# Patient Record
Sex: FEMALE | Race: WHITE | NOT HISPANIC OR LATINO
[De-identification: names, ages, dates, MRNs, and addresses within clinical notes are randomized per-mention and may not be internally consistent; named-entity substitution may affect disease eponyms.]

---

## 2020-02-08 PROBLEM — Z00.00 ENCOUNTER FOR PREVENTIVE HEALTH EXAMINATION: Status: ACTIVE | Noted: 2020-02-08

## 2023-05-30 ENCOUNTER — APPOINTMENT (OUTPATIENT)
Dept: CARDIOLOGY | Facility: CLINIC | Age: 43
End: 2023-05-30

## 2023-07-28 ENCOUNTER — APPOINTMENT (OUTPATIENT)
Dept: HUMAN REPRODUCTION | Facility: CLINIC | Age: 43
End: 2023-07-28
Payer: COMMERCIAL

## 2023-07-28 PROCEDURE — 76830 TRANSVAGINAL US NON-OB: CPT

## 2023-07-28 PROCEDURE — 36415 COLL VENOUS BLD VENIPUNCTURE: CPT

## 2023-07-28 PROCEDURE — 99205 OFFICE O/P NEW HI 60 MIN: CPT | Mod: 25

## 2023-08-16 ENCOUNTER — APPOINTMENT (OUTPATIENT)
Dept: PULMONOLOGY | Facility: CLINIC | Age: 43
End: 2023-08-16
Payer: COMMERCIAL

## 2023-08-16 VITALS
BODY MASS INDEX: 22.09 KG/M2 | WEIGHT: 131 LBS | OXYGEN SATURATION: 97 % | DIASTOLIC BLOOD PRESSURE: 79 MMHG | HEIGHT: 64.5 IN | HEART RATE: 100 BPM | SYSTOLIC BLOOD PRESSURE: 120 MMHG | RESPIRATION RATE: 15 BRPM | TEMPERATURE: 97.8 F

## 2023-08-16 DIAGNOSIS — Z78.9 OTHER SPECIFIED HEALTH STATUS: ICD-10-CM

## 2023-08-16 PROCEDURE — 99203 OFFICE O/P NEW LOW 30 MIN: CPT

## 2023-08-16 NOTE — PHYSICAL EXAM
[No Acute Distress] : no acute distress [Well Nourished] : well nourished [No Deformities] : no deformities [Enlarged Base of the Tongue] : enlarged base of the tongue [IV] : Mallampati Class: IV [Normal Appearance] : normal appearance [No Neck Mass] : no neck mass [Normal Rate/Rhythm] : normal rate/rhythm [Normal S1, S2] : normal s1, s2 [No Resp Distress] : no resp distress [Clear to Auscultation Bilaterally] : clear to auscultation bilaterally [No Clubbing] : no clubbing [No Cyanosis] : no cyanosis [No Edema] : no edema [No Focal Deficits] : no focal deficits [Oriented x3] : oriented x3 [Normal Mood] : normal mood [Normal Affect] : normal affect

## 2023-08-16 NOTE — HISTORY OF PRESENT ILLNESS
[Never] : never [Awakes Unrefreshed] : awakes unrefreshed [Awakes with Dry Mouth] : awakes with dry mouth [Difficulty Maintaining Sleep] : difficulty maintaining sleep [Unusual Sleep Behavior] : unusual sleep behavior [TextBox_4] : This is a 43 year old female who presents to the office with concerns about her sleep. She mentions having heart palpitations in March but was reassured by a visit to [place] that everything seemed fine. She reports experiencing symptoms such as night terrors, dry mouth upon waking up, and difficulty breathing through her nose due to previous nasal surgeries. The patient describes herself as a light sleeper who wakes up early and experiences sleep disturbances throughout the night.  She has tried melatonin spray and Ambien for sleep but prefers not to rely on medication. The patient has no known allergies and reports using marijuana edibles as a sleep aid. She wishes to undergo a sleep test to rule out any sleep-related issues.  [Awakes with Headache] : does not awaken with headache [Snoring] : no snoring [Witnessed Apneas] : no witnessed apneas [Hypersomnolence] : no hypersomnolence [Cataplexy] : no cataplexy [Sleep Paralysis] : no sleep paralysis [Hypnagogic Hallucinations] : no hypnagogic hallucinations [Hypnopompic Hallucinations] : no hypnopompic hallucinations [Lower Extremity Discomfort] : does not have lower extremity discomfort [Irresistible urge to move legs] : does not have irresistible urge to move legs [LE discomfort relieved by movement] : denies lower extremity discomfort relieved by movement [Late day/ Evening symptoms] : does not have late day/ evening symptoms [Sleep Disturbances due to LE symptoms] : denies sleep disturbances due to lower extremity symptoms [TextBox_77] : 11pm [TextBox_79] : 5:00am [TextBox_81] : 05-42 [TextBox_83] : 4-5 [ESS] : 10

## 2023-08-16 NOTE — ASSESSMENT
[FreeTextEntry1] : This is a 43-year-old female with no significant past medical history who comes in for evaluation of possible sleep disordered breathing.  #Snoring–patient has signs and symptoms possible sleep disordered breathing including snoring, nonrestorative sleep, early morning dry mouth, excessive daytime somnolence, and frequent nocturnal awakenings. She was referred to the [facility] for a diagnostic HSAT. The ramifications of LOKESH and its potential therapeutic modalities were discussed with the patient. The patient was cautioned on the importance of avoiding drowsy driving. She will follow up with us after the HSAT.

## 2023-08-29 ENCOUNTER — APPOINTMENT (OUTPATIENT)
Dept: HUMAN REPRODUCTION | Facility: CLINIC | Age: 43
End: 2023-08-29
Payer: COMMERCIAL

## 2023-08-29 PROCEDURE — 99215 OFFICE O/P EST HI 40 MIN: CPT

## 2023-09-14 ENCOUNTER — OUTPATIENT (OUTPATIENT)
Dept: OUTPATIENT SERVICES | Facility: HOSPITAL | Age: 43
LOS: 1 days | End: 2023-09-14
Payer: COMMERCIAL

## 2023-09-14 ENCOUNTER — APPOINTMENT (OUTPATIENT)
Dept: SLEEP CENTER | Facility: CLINIC | Age: 43
End: 2023-09-14
Payer: COMMERCIAL

## 2023-09-14 PROCEDURE — 95800 SLP STDY UNATTENDED: CPT

## 2023-09-14 PROCEDURE — 95800 SLP STDY UNATTENDED: CPT | Mod: 26

## 2023-09-28 DIAGNOSIS — G47.33 OBSTRUCTIVE SLEEP APNEA (ADULT) (PEDIATRIC): ICD-10-CM

## 2023-09-29 ENCOUNTER — APPOINTMENT (OUTPATIENT)
Dept: PULMONOLOGY | Facility: CLINIC | Age: 43
End: 2023-09-29
Payer: COMMERCIAL

## 2023-09-29 DIAGNOSIS — R06.83 SNORING: ICD-10-CM

## 2023-09-29 PROCEDURE — 99213 OFFICE O/P EST LOW 20 MIN: CPT | Mod: 95

## 2023-10-02 ENCOUNTER — NON-APPOINTMENT (OUTPATIENT)
Age: 43
End: 2023-10-02

## 2023-10-06 ENCOUNTER — APPOINTMENT (OUTPATIENT)
Dept: HUMAN REPRODUCTION | Facility: CLINIC | Age: 43
End: 2023-10-06

## 2023-10-06 ENCOUNTER — APPOINTMENT (OUTPATIENT)
Dept: HUMAN REPRODUCTION | Facility: CLINIC | Age: 43
End: 2023-10-06
Payer: COMMERCIAL

## 2023-10-06 PROCEDURE — 99213 OFFICE O/P EST LOW 20 MIN: CPT | Mod: 25

## 2023-10-06 PROCEDURE — 76830 TRANSVAGINAL US NON-OB: CPT

## 2023-10-06 PROCEDURE — 36415 COLL VENOUS BLD VENIPUNCTURE: CPT

## 2023-10-11 ENCOUNTER — APPOINTMENT (OUTPATIENT)
Dept: HUMAN REPRODUCTION | Facility: CLINIC | Age: 43
End: 2023-10-11
Payer: COMMERCIAL

## 2023-10-11 PROCEDURE — 76857 US EXAM PELVIC LIMITED: CPT

## 2023-10-11 PROCEDURE — 36415 COLL VENOUS BLD VENIPUNCTURE: CPT

## 2023-10-11 PROCEDURE — 99213 OFFICE O/P EST LOW 20 MIN: CPT | Mod: 25

## 2023-10-15 ENCOUNTER — APPOINTMENT (OUTPATIENT)
Dept: HUMAN REPRODUCTION | Facility: CLINIC | Age: 43
End: 2023-10-15
Payer: COMMERCIAL

## 2023-10-15 PROCEDURE — 76857 US EXAM PELVIC LIMITED: CPT

## 2023-10-15 PROCEDURE — 36415 COLL VENOUS BLD VENIPUNCTURE: CPT

## 2023-10-15 PROCEDURE — 99213 OFFICE O/P EST LOW 20 MIN: CPT | Mod: 25

## 2023-10-18 ENCOUNTER — APPOINTMENT (OUTPATIENT)
Dept: HUMAN REPRODUCTION | Facility: CLINIC | Age: 43
End: 2023-10-18
Payer: COMMERCIAL

## 2023-10-18 PROCEDURE — 36415 COLL VENOUS BLD VENIPUNCTURE: CPT

## 2023-10-18 PROCEDURE — 76857 US EXAM PELVIC LIMITED: CPT

## 2023-10-18 PROCEDURE — 99213 OFFICE O/P EST LOW 20 MIN: CPT | Mod: 25

## 2023-10-20 ENCOUNTER — APPOINTMENT (OUTPATIENT)
Dept: HUMAN REPRODUCTION | Facility: CLINIC | Age: 43
End: 2023-10-20
Payer: COMMERCIAL

## 2023-10-20 PROCEDURE — 76857 US EXAM PELVIC LIMITED: CPT

## 2023-10-20 PROCEDURE — 99213 OFFICE O/P EST LOW 20 MIN: CPT | Mod: 25

## 2023-10-20 PROCEDURE — 36415 COLL VENOUS BLD VENIPUNCTURE: CPT

## 2023-10-22 ENCOUNTER — APPOINTMENT (OUTPATIENT)
Dept: HUMAN REPRODUCTION | Facility: CLINIC | Age: 43
End: 2023-10-22
Payer: COMMERCIAL

## 2023-10-22 PROCEDURE — 76857 US EXAM PELVIC LIMITED: CPT

## 2023-10-22 PROCEDURE — 99213 OFFICE O/P EST LOW 20 MIN: CPT | Mod: 25

## 2023-10-22 PROCEDURE — 36415 COLL VENOUS BLD VENIPUNCTURE: CPT

## 2023-10-23 ENCOUNTER — APPOINTMENT (OUTPATIENT)
Dept: HUMAN REPRODUCTION | Facility: CLINIC | Age: 43
End: 2023-10-23
Payer: COMMERCIAL

## 2023-10-23 PROCEDURE — 99213 OFFICE O/P EST LOW 20 MIN: CPT | Mod: 25

## 2023-10-23 PROCEDURE — 36415 COLL VENOUS BLD VENIPUNCTURE: CPT

## 2023-10-23 PROCEDURE — 76857 US EXAM PELVIC LIMITED: CPT

## 2023-10-24 ENCOUNTER — APPOINTMENT (OUTPATIENT)
Dept: HUMAN REPRODUCTION | Facility: CLINIC | Age: 43
End: 2023-10-24

## 2023-10-24 ENCOUNTER — APPOINTMENT (OUTPATIENT)
Dept: HUMAN REPRODUCTION | Facility: CLINIC | Age: 43
End: 2023-10-24
Payer: COMMERCIAL

## 2023-10-24 PROCEDURE — 36415 COLL VENOUS BLD VENIPUNCTURE: CPT

## 2023-10-25 ENCOUNTER — APPOINTMENT (OUTPATIENT)
Dept: HUMAN REPRODUCTION | Facility: CLINIC | Age: 43
End: 2023-10-25
Payer: COMMERCIAL

## 2023-10-25 PROCEDURE — 76948 ECHO GUIDE OVA ASPIRATION: CPT

## 2023-10-25 PROCEDURE — 89254 OOCYTE IDENTIFICATION: CPT

## 2023-10-25 PROCEDURE — 58970 RETRIEVAL OF OOCYTE: CPT

## 2023-10-25 PROCEDURE — 89346 STORAGE/YEAR OOCYTE(S): CPT

## 2023-10-25 PROCEDURE — 89337 CRYOPRESERVATION OOCYTE(S): CPT

## 2023-10-26 ENCOUNTER — APPOINTMENT (OUTPATIENT)
Dept: HUMAN REPRODUCTION | Facility: CLINIC | Age: 43
End: 2023-10-26
Payer: COMMERCIAL

## 2023-11-21 ENCOUNTER — APPOINTMENT (OUTPATIENT)
Age: 43
End: 2023-11-21

## 2024-01-31 ENCOUNTER — APPOINTMENT (OUTPATIENT)
Age: 44
End: 2024-01-31
Payer: COMMERCIAL

## 2024-01-31 PROCEDURE — D1110 PROPHYLAXIS - ADULT: CPT

## 2024-01-31 PROCEDURE — D0120: CPT

## 2024-02-15 ENCOUNTER — APPOINTMENT (OUTPATIENT)
Age: 44
End: 2024-02-15
Payer: COMMERCIAL

## 2024-02-15 PROCEDURE — D2391: CPT

## 2024-03-07 ENCOUNTER — APPOINTMENT (OUTPATIENT)
Age: 44
End: 2024-03-07
Payer: COMMERCIAL

## 2024-03-07 PROCEDURE — D9944: CPT

## 2024-04-01 ENCOUNTER — NON-APPOINTMENT (OUTPATIENT)
Age: 44
End: 2024-04-01

## 2024-04-11 ENCOUNTER — APPOINTMENT (OUTPATIENT)
Dept: DERMATOLOGY | Facility: CLINIC | Age: 44
End: 2024-04-11
Payer: COMMERCIAL

## 2024-04-11 DIAGNOSIS — L30.9 DERMATITIS, UNSPECIFIED: ICD-10-CM

## 2024-04-11 PROCEDURE — 99203 OFFICE O/P NEW LOW 30 MIN: CPT

## 2024-04-11 NOTE — ASSESSMENT
[FreeTextEntry1] :  seborrheic dermatitis, flaring discussed nature, chronicity and unpredictable course pimecrolimus to AA BID PRN flares vanicream z bar as face wash during bathing 3x weekly for maintenance

## 2024-04-11 NOTE — PHYSICAL EXAM
[Alert] : alert [Oriented x 3] : ~L oriented x 3 [Well Nourished] : well nourished [Conjunctiva Non-injected] : conjunctiva non-injected [No Visual Lymphadenopathy] : no visual  lymphadenopathy [No Clubbing] : no clubbing [No Edema] : no edema [No Bromhidrosis] : no bromhidrosis [No Chromhidrosis] : no chromhidrosis [FreeTextEntry3] : erythematous greasy patches on b/l NLF and chin

## 2024-04-11 NOTE — HISTORY OF PRESENT ILLNESS
[FreeTextEntry1] : np rash [de-identified] : Ms. CARY EVANS  is a 44 year old F here for evaluation of below  #asx rash around mouth , intermittent x yrs - tried many emollients - econazole and HC no personal or family h/o skin cancer Social Hx: director of clinical research for dental

## 2024-04-12 ENCOUNTER — TRANSCRIPTION ENCOUNTER (OUTPATIENT)
Age: 44
End: 2024-04-12

## 2024-04-15 RX ORDER — PIMECROLIMUS 10 MG/G
1 CREAM TOPICAL TWICE DAILY
Qty: 1 | Refills: 6 | Status: ACTIVE | COMMUNITY
Start: 2024-04-11 | End: 1900-01-01

## 2024-04-18 ENCOUNTER — EMERGENCY (EMERGENCY)
Facility: HOSPITAL | Age: 44
LOS: 0 days | Discharge: HOME | End: 2024-04-18
Attending: STUDENT IN AN ORGANIZED HEALTH CARE EDUCATION/TRAINING PROGRAM
Payer: COMMERCIAL

## 2024-04-18 VITALS
DIASTOLIC BLOOD PRESSURE: 72 MMHG | OXYGEN SATURATION: 97 % | RESPIRATION RATE: 17 BRPM | TEMPERATURE: 99 F | SYSTOLIC BLOOD PRESSURE: 110 MMHG | HEART RATE: 90 BPM

## 2024-04-18 VITALS
WEIGHT: 132.94 LBS | HEIGHT: 64.5 IN | TEMPERATURE: 98 F | RESPIRATION RATE: 14 BRPM | SYSTOLIC BLOOD PRESSURE: 136 MMHG | HEART RATE: 82 BPM | OXYGEN SATURATION: 98 % | DIASTOLIC BLOOD PRESSURE: 89 MMHG

## 2024-04-18 DIAGNOSIS — R82.71 BACTERIURIA: ICD-10-CM

## 2024-04-18 DIAGNOSIS — K59.00 CONSTIPATION, UNSPECIFIED: ICD-10-CM

## 2024-04-18 DIAGNOSIS — R10.9 UNSPECIFIED ABDOMINAL PAIN: ICD-10-CM

## 2024-04-18 DIAGNOSIS — R11.0 NAUSEA: ICD-10-CM

## 2024-04-18 DIAGNOSIS — Z88.0 ALLERGY STATUS TO PENICILLIN: ICD-10-CM

## 2024-04-18 DIAGNOSIS — D72.829 ELEVATED WHITE BLOOD CELL COUNT, UNSPECIFIED: ICD-10-CM

## 2024-04-18 DIAGNOSIS — N13.2 HYDRONEPHROSIS WITH RENAL AND URETERAL CALCULOUS OBSTRUCTION: ICD-10-CM

## 2024-04-18 DIAGNOSIS — D25.9 LEIOMYOMA OF UTERUS, UNSPECIFIED: ICD-10-CM

## 2024-04-18 LAB
ALBUMIN SERPL ELPH-MCNC: 4 G/DL — SIGNIFICANT CHANGE UP (ref 3.3–5)
ALP SERPL-CCNC: 53 U/L — SIGNIFICANT CHANGE UP (ref 40–120)
ALT FLD-CCNC: 13 U/L — SIGNIFICANT CHANGE UP (ref 12–78)
ANION GAP SERPL CALC-SCNC: 10 MMOL/L — SIGNIFICANT CHANGE UP (ref 5–17)
APPEARANCE UR: CLEAR — SIGNIFICANT CHANGE UP
AST SERPL-CCNC: 12 U/L — LOW (ref 15–37)
BACTERIA # UR AUTO: ABNORMAL /HPF
BASOPHILS # BLD AUTO: 0.06 K/UL — SIGNIFICANT CHANGE UP (ref 0–0.2)
BASOPHILS NFR BLD AUTO: 0.3 % — SIGNIFICANT CHANGE UP (ref 0–2)
BILIRUB SERPL-MCNC: 1.9 MG/DL — HIGH (ref 0.2–1.2)
BILIRUB UR-MCNC: NEGATIVE — SIGNIFICANT CHANGE UP
BUN SERPL-MCNC: 24 MG/DL — HIGH (ref 7–23)
CALCIUM SERPL-MCNC: 9.4 MG/DL — SIGNIFICANT CHANGE UP (ref 8.5–10.1)
CHLORIDE SERPL-SCNC: 106 MMOL/L — SIGNIFICANT CHANGE UP (ref 96–108)
CO2 SERPL-SCNC: 23 MMOL/L — SIGNIFICANT CHANGE UP (ref 22–31)
COLOR SPEC: YELLOW — SIGNIFICANT CHANGE UP
CREAT SERPL-MCNC: 1.05 MG/DL — SIGNIFICANT CHANGE UP (ref 0.5–1.3)
DIFF PNL FLD: ABNORMAL
EGFR: 68 ML/MIN/1.73M2 — SIGNIFICANT CHANGE UP
EOSINOPHIL # BLD AUTO: 0.01 K/UL — SIGNIFICANT CHANGE UP (ref 0–0.5)
EOSINOPHIL NFR BLD AUTO: 0.1 % — SIGNIFICANT CHANGE UP (ref 0–6)
GLUCOSE SERPL-MCNC: 133 MG/DL — HIGH (ref 70–99)
GLUCOSE UR QL: NEGATIVE MG/DL — SIGNIFICANT CHANGE UP
HCG SERPL-ACNC: <1 MIU/ML — SIGNIFICANT CHANGE UP
HCT VFR BLD CALC: 40 % — SIGNIFICANT CHANGE UP (ref 34.5–45)
HGB BLD-MCNC: 13.5 G/DL — SIGNIFICANT CHANGE UP (ref 11.5–15.5)
IMM GRANULOCYTES NFR BLD AUTO: 0.5 % — SIGNIFICANT CHANGE UP (ref 0–0.9)
KETONES UR-MCNC: 40 MG/DL
LEUKOCYTE ESTERASE UR-ACNC: NEGATIVE — SIGNIFICANT CHANGE UP
LIDOCAIN IGE QN: 44 U/L — SIGNIFICANT CHANGE UP (ref 13–75)
LYMPHOCYTES # BLD AUTO: 1.16 K/UL — SIGNIFICANT CHANGE UP (ref 1–3.3)
LYMPHOCYTES # BLD AUTO: 6.5 % — LOW (ref 13–44)
MAGNESIUM SERPL-MCNC: 2 MG/DL — SIGNIFICANT CHANGE UP (ref 1.6–2.6)
MCHC RBC-ENTMCNC: 29.7 PG — SIGNIFICANT CHANGE UP (ref 27–34)
MCHC RBC-ENTMCNC: 33.8 G/DL — SIGNIFICANT CHANGE UP (ref 32–36)
MCV RBC AUTO: 88.1 FL — SIGNIFICANT CHANGE UP (ref 80–100)
MONOCYTES # BLD AUTO: 0.51 K/UL — SIGNIFICANT CHANGE UP (ref 0–0.9)
MONOCYTES NFR BLD AUTO: 2.9 % — SIGNIFICANT CHANGE UP (ref 2–14)
NEUTROPHILS # BLD AUTO: 15.93 K/UL — HIGH (ref 1.8–7.4)
NEUTROPHILS NFR BLD AUTO: 89.7 % — HIGH (ref 43–77)
NITRITE UR-MCNC: NEGATIVE — SIGNIFICANT CHANGE UP
NRBC # BLD: 0 /100 WBCS — SIGNIFICANT CHANGE UP (ref 0–0)
PH UR: 7 — SIGNIFICANT CHANGE UP (ref 5–8)
PLATELET # BLD AUTO: 350 K/UL — SIGNIFICANT CHANGE UP (ref 150–400)
POTASSIUM SERPL-MCNC: 3.9 MMOL/L — SIGNIFICANT CHANGE UP (ref 3.5–5.3)
POTASSIUM SERPL-SCNC: 3.9 MMOL/L — SIGNIFICANT CHANGE UP (ref 3.5–5.3)
PROT SERPL-MCNC: 8 GM/DL — SIGNIFICANT CHANGE UP (ref 6–8.3)
PROT UR-MCNC: 30 MG/DL
RBC # BLD: 4.54 M/UL — SIGNIFICANT CHANGE UP (ref 3.8–5.2)
RBC # FLD: 13.2 % — SIGNIFICANT CHANGE UP (ref 10.3–14.5)
RBC CASTS # UR COMP ASSIST: 100 /HPF — HIGH (ref 0–4)
SODIUM SERPL-SCNC: 139 MMOL/L — SIGNIFICANT CHANGE UP (ref 135–145)
SP GR SPEC: >1.03 — HIGH (ref 1–1.03)
UROBILINOGEN FLD QL: 0.2 MG/DL — SIGNIFICANT CHANGE UP (ref 0.2–1)
WBC # BLD: 18.08 K/UL — HIGH (ref 3.8–10.5)
WBC # FLD AUTO: 18.08 K/UL — HIGH (ref 3.8–10.5)
WBC UR QL: 1 /HPF — SIGNIFICANT CHANGE UP (ref 0–5)

## 2024-04-18 PROCEDURE — 76830 TRANSVAGINAL US NON-OB: CPT | Mod: 26

## 2024-04-18 PROCEDURE — 74177 CT ABD & PELVIS W/CONTRAST: CPT | Mod: 26,MC

## 2024-04-18 PROCEDURE — 99285 EMERGENCY DEPT VISIT HI MDM: CPT

## 2024-04-18 RX ORDER — ACETAMINOPHEN 500 MG
1000 TABLET ORAL ONCE
Refills: 0 | Status: COMPLETED | OUTPATIENT
Start: 2024-04-18 | End: 2024-04-18

## 2024-04-18 RX ORDER — ONDANSETRON 8 MG/1
1 TABLET, FILM COATED ORAL
Qty: 15 | Refills: 0
Start: 2024-04-18 | End: 2024-04-22

## 2024-04-18 RX ORDER — FLUCONAZOLE 150 MG/1
1 TABLET ORAL
Qty: 1 | Refills: 0
Start: 2024-04-18 | End: 2024-04-18

## 2024-04-18 RX ORDER — ONDANSETRON 8 MG/1
4 TABLET, FILM COATED ORAL ONCE
Refills: 0 | Status: COMPLETED | OUTPATIENT
Start: 2024-04-18 | End: 2024-04-18

## 2024-04-18 RX ORDER — MORPHINE SULFATE 50 MG/1
4 CAPSULE, EXTENDED RELEASE ORAL ONCE
Refills: 0 | Status: DISCONTINUED | OUTPATIENT
Start: 2024-04-18 | End: 2024-04-18

## 2024-04-18 RX ORDER — OXYCODONE HYDROCHLORIDE 5 MG/1
1 TABLET ORAL
Qty: 9 | Refills: 0
Start: 2024-04-18 | End: 2024-04-20

## 2024-04-18 RX ORDER — SODIUM CHLORIDE 9 MG/ML
1000 INJECTION INTRAMUSCULAR; INTRAVENOUS; SUBCUTANEOUS ONCE
Refills: 0 | Status: COMPLETED | OUTPATIENT
Start: 2024-04-18 | End: 2024-04-18

## 2024-04-18 RX ORDER — LEVOFLOXACIN 5 MG/ML
1 INJECTION, SOLUTION INTRAVENOUS
Qty: 4 | Refills: 0
Start: 2024-04-18 | End: 2024-04-21

## 2024-04-18 RX ORDER — TAMSULOSIN HYDROCHLORIDE 0.4 MG/1
1 CAPSULE ORAL
Qty: 14 | Refills: 0
Start: 2024-04-18 | End: 2024-05-01

## 2024-04-18 RX ADMIN — Medication 400 MILLIGRAM(S): at 10:46

## 2024-04-18 RX ADMIN — Medication 1000 MILLIGRAM(S): at 11:20

## 2024-04-18 RX ADMIN — ONDANSETRON 4 MILLIGRAM(S): 8 TABLET, FILM COATED ORAL at 10:46

## 2024-04-18 RX ADMIN — SODIUM CHLORIDE 1000 MILLILITER(S): 9 INJECTION INTRAMUSCULAR; INTRAVENOUS; SUBCUTANEOUS at 12:14

## 2024-04-18 RX ADMIN — MORPHINE SULFATE 4 MILLIGRAM(S): 50 CAPSULE, EXTENDED RELEASE ORAL at 11:37

## 2024-04-18 RX ADMIN — SODIUM CHLORIDE 1000 MILLILITER(S): 9 INJECTION INTRAMUSCULAR; INTRAVENOUS; SUBCUTANEOUS at 13:20

## 2024-04-18 RX ADMIN — MORPHINE SULFATE 4 MILLIGRAM(S): 50 CAPSULE, EXTENDED RELEASE ORAL at 11:22

## 2024-04-18 RX ADMIN — SODIUM CHLORIDE 1000 MILLILITER(S): 9 INJECTION INTRAMUSCULAR; INTRAVENOUS; SUBCUTANEOUS at 10:47

## 2024-04-18 NOTE — ED ADULT NURSE NOTE - NSFALLUNIVINTERV_ED_ALL_ED
Called patient and informed he needs to quarantine. Quarantine if he has been in close contact within 6 feet of someone for cumulative total of 15 min or more over a 24 hour period with someone who has covid-19, unless you have been fully vaccinated. People who are fully vaccinated do not need to quarantine after contact with someone who has covid-19 unless they have symptoms. However fully vaccinated people should get tested 5-7 days after their exposure, even if they don't have symptoms and wear a mask indoors in public for 14 days following exposure or until test is negative. Patient verbally understood. Bed/Stretcher in lowest position, wheels locked, appropriate side rails in place/Call bell, personal items and telephone in reach/Instruct patient to call for assistance before getting out of bed/chair/stretcher/Non-slip footwear applied when patient is off stretcher/[place] to call system/Physically safe environment - no spills, clutter or unnecessary equipment/Purposeful proactive rounding/Room/bathroom lighting operational, light cord in reach

## 2024-04-18 NOTE — ED ADULT NURSE NOTE - OBJECTIVE STATEMENT
Patient received A&Ox4 breathing unlabored on room complaining of RLQ pain for the week, patient reports pain gradually come on and has worsen today. Patient vomiting yellow bile, diarrhea x1 last week. Denies any vaginal discharge, urinary symptoms.

## 2024-04-18 NOTE — ED PROVIDER NOTE - CARE PROVIDERS DIRECT ADDRESSES
[email or web address],DirectAddress_Unknown

## 2024-04-18 NOTE — ED PROVIDER NOTE - PROGRESS NOTE DETAILS
Attending Danii: spoke w/ US tech and expedited exam Attending Danii: US w/ fibroid only. CT w/ R sided 4 mm stone causing hydro. Cr WNL. WBC elevated and bacteria in urine. discussed w/ pt. pt reports symptoms improved and is comfortable w/ DC. has family urologist to follow up w/. medications sent to pharmacy. strict return precautions provided and discussed, pt verbalized understanding. ready for DC.

## 2024-04-18 NOTE — ED PROVIDER NOTE - CARE PROVIDER_API CALL
Arturo Dave  Urology  [address] [place] [address], [id number]  [place] [address]  Phone: [phone]  Fax: [phone]  Follow Up Time:     Paulette Cuevas  Urology  [id number]  Phone: [phone]  Fax: [phone]  Follow Up Time:

## 2024-04-18 NOTE — ED PROVIDER NOTE - CLINICAL SUMMARY MEDICAL DECISION MAKING FREE TEXT BOX
45 y/o F w/ PMH of ovarian cyst presenting w/ abdominal pain. Seen w/ mother. Reports for the past 1 week having intermittent R sided abd pain radiating to R flank. Pain improves w/ tylenol. Came back this morning around 3am and was worse than it has been. Took tylenol then but no significant relief so came to ED for eval. Associated w/ nausea, but no vomiting. +constipation. Denies hx of kidney stones. Denies fevers, chills, headache, dizziness, blurred vision, chest pain, cough, shortness of breath, urinary symptoms, MSK pain, rash. Pt overall no acute distress. Given hx of cyst, will need to r/o ovarian pathology such as torsion. Possible renal colic. Will obtain US and CT a/p to r/o acute abdomina/pelvic pathologies. Eval for Pyelo. Plan for labs, imaging, meds. Will reassess the need for additional interventions as clinically warranted. Refer to any progress notes for updates on clinical course and as a continuation of this MDM.

## 2024-04-18 NOTE — ED PROVIDER NOTE - PATIENT PORTAL LINK FT
You can access the FollowMyHealth Patient Portal offered by [facility] by registering at the following website: [email or web address]. By joining [employer]’s FollowMyHealth portal, you will also be able to view your health information using other applications (apps) compatible with our system.

## 2024-04-18 NOTE — ED PROVIDER NOTE - NSFOLLOWUPINSTRUCTIONS_ED_ALL_ED_FT
You were found to have a 4 mm kidney stone on the right side. This is likely the cause of your symptoms.    1) Follow up with your doctor this week. Please call the urologist to arrange follow up  2) Return to the ED immediately for new or worsening symptoms  3) Please continue to take any home medications as prescribed  4) Your test results from your ED visit were discussed with you prior to discharge  5) You were provided with a copy of your test results  6) Please  your medications at the pharmacy and take as directed  7) Please take Tylenol 975 mg every 6 hours as needed for pain. Please do not exceed more than 4,000 mg of Tylenol in a day   8) Please take Motrin 600 mg by mouth every 6 hours as needed for pain. Please take this medication with food.  9) Please stay well hydrated  10) Please strain your urine to ensure kidney stone passes    Kidney Stones    Kidney stones (urolithiasis) are crystal deposits that form inside your kidneys. Pain is caused by the stone moving through the urinary tract, causing spasms of the ureter. Drink enough water and fluids to keep your urine clear or pale yellow. This will help you to pass the stone or stone fragments. If provided a strainer, strain all urine and keep all particulate matter and stones for a follow up appointment with a urologist.    SEEK IMMEDIATE MEDICAL CARE IF YOU HAVE ANY OF THE FOLLOWING SYMPTOMS: pain not controlled with medication, fever/chills, worsening vomiting, inability to urinate, or dizziness/lightheadedness.     Urinary Tract Infection    A urinary tract infection (UTI) is an infection of any part of the urinary tract, which includes the kidneys, ureters, bladder, and urethra. Risk factors include ignoring your need to urinate, wiping back to front if female, being an uncircumcised male, and having diabetes or a weak immune system. Symptoms include frequent urination, pain or burning with urination, foul smelling urine, cloudy urine, pain in the lower abdomen, blood in the urine, and fever. If you were prescribed an antibiotic medicine, take it as told by your health care provider. Do not stop taking the antibiotic even if you start to feel better.    SEEK IMMEDIATE MEDICAL CARE IF YOU HAVE ANY OF THE FOLLOWING SYMPTOMS: severe back or abdominal pain, fever, inability to keep fluids or medicine down, dizziness/lightheadedness, or a change in mental status.

## 2024-04-18 NOTE — ED ADULT NURSE NOTE - IN THE PAST 12 MONTHS HAVE YOU USED DRUGS OTHER THAN THOSE REQUIRED FOR MEDICAL REASON?
SUBJECTIVE:   Haven is a 45 year old  female.  Patient is here for No chief complaint on file.    Patient here with her , Denis, with earlier visit today with [place] for routine prenatal care.  While there, pt reports he was unable to clearly hear heart tones with doppler and so a transabdominal ultrasound was done.  The baby did not have a heartbeat and was not moving.  She is very upset as is her  as this was a highly desired spontaneous pregnancy.  Did have some evaluation and reports all normal genetic screening.  She had been feeling well, normal pregnancy symptoms,one episode of emesis last week.  No other systemic symptoms.  This is the patients second miscarriage, first 10 years ago and had a D&C complicated with retained products.    Review of Systems   Constitutional: Negative for fever, chills  Gastrointestinal: Negative for abdominal pain, constipation, diarrhea, nausea and vomiting.   Genitourinary: Negative for incontinence, difficulty urinating, dysuria and hematuria, vaginal discharge, bleeding.   Neurological: Negative for vision changes and headaches.    Psychiatric/Behavioral: Negative for dysphoric mood, self-injury, sleep disturbance and suicidal ideas. The patient is not nervous/anxious.  Integument:  Negative rashes      See HPI for pertinent positives.  All other systems reviewed and are negative.     Patient Active Problem List   Diagnosis   • PTSD (post-traumatic stress disorder)   • PMDD (premenstrual dysphoric disorder)   • Rh negative state in antepartum period   • AMA (advanced maternal age) primigravida 35+   • Threatened miscarriage in early pregnancy     Past Medical History:   Diagnosis Date   • H/O lithotripsy    • Kidney stones     Lithrotripsy done   • PMDD (premenstrual dysphoric disorder)      ALLERGIES:   Allergen Reactions   • Amoxicillin ANAPHYLAXIS   • Penicillins ANAPHYLAXIS     And any \"cillin\" drugs  And any \"cillin\" drugs       Current Medications     ASCORBIC ACID (VITAMIN C PO)        ASPIRIN (ECOTRIN) 81 MG EC TABLET    Take 81 mg by mouth daily.    CHOLECALCIFEROL (VITAMIN D3) 25 MCG (1,000 UNITS) CAPSULE    Take 2,000 Units by mouth daily.    DOCOSAHEXAENOIC ACID (DHA COMPLETE PO)    Take 500 mg by mouth daily. Contains:   MG  Fish oil 795 MG, Omega 3 586 MG, EPA 22 MG    PRENATAL VIT-FE FUMARATE-FA (MULTIVITAMIN & MINERAL W/FOLIC ACID- PRENATAL) 27-1 MG TAB    Take 1 tablet by mouth daily.    PYRIDOXINE HCL (VITAMIN B6 PO)    Take 10 mg by mouth as needed. Indications: Nausea Vitamin B6 10 MG with Ginger root 25 MG    ZINC 50 MG TAB    Take 50 mg by mouth daily.    ZINC SULFATE (ZINCATE) 44 MG/ML (COMPOUNDED) ORAL SUSPENSION           OBJECTIVE:    Visit Vitals  BP (!) 161/111   Pulse 69   LMP 06/16/2023     Physical Exam   Constitutional: Alert and oriented x3, well-nourished, well-appearing.  Abdominal: Soft, nontender, nondistended.  No rebound/guarding/rigidity.  No palpable mass.  No hernia.  No hepatosplenomegaly.    Genitourinary: defer  Musculoskeletal: Normal range of motion. She exhibits no edema.   Neurological: CN II-XII grossly intact  Skin: Skin is warm and dry. No rash noted. No erythema.   Psychiatric: She has a normal mood and tearful appropriate affect.     Imaging:  Limited TAUS  Anteverted uterus with single fetus in collapsing gestational sac. Visible normal spine.  Fetal calvarium appears less symmetric and more oval shape. Due to abnormal fetal positioning unable to obtain CRL but HC 8.86cm c/w 14w0d.  No fetal motion nor heart beat identified consistent with second trimester pregnancy loss.Developing placenta over cervix.    ASSESSMENT AND PLAN:  Miscarriage  (primary encounter diagnosis)  Plan: Lupus Anticoagulant, Beta 2 Glycoprotein Panel,        Cardiolipin Antibody IgG, Cardiolipin Antibody         IgM  - couple counseled and confirmed on Lili lay findings of second trimester pregnancy loss  - disc etiologies  typically as chromosomal; did have negative genetic Panorama screening but discussed limited results in this.  - disc option for additional chromosome and microarray tests with dilation and curettage/dilation and evacuation  - disc benefit in APLAS testing and this is ordered; will be done with her IV at time of procedure  - pt is preferring for surgical management for this reason and given previous history with stressful first loss  - concur with patient; [place] notified and he will arrange for patients surgical management  - interim precautions reviewed and pt will notify if needing assistance    RTC postop    Total time spent in preparation/coordination of and care for patient 38 minutes.    Autumn Conner MD     No

## 2024-04-18 NOTE — ED PROVIDER NOTE - OBJECTIVE STATEMENT
45 y/o F w/ PMH of ovarian cyst presenting w/ abdominal pain. Seen w/ mother. Reports for the past 1 week having intermittent R sided abd pain radiating to R flank. Pain improves w/ tylenol. Came back this morning around 3am and was worse than it has been. Took tylenol then but no significant relief so came to ED for eval. Associated w/ nausea, but no vomiting. +constipation. Denies hx of kidney stones. Denies fevers, chills, headache, dizziness, blurred vision, chest pain, cough, shortness of breath, urinary symptoms, MSK pain, rash.

## 2024-04-19 ENCOUNTER — APPOINTMENT (OUTPATIENT)
Dept: UROLOGY | Facility: CLINIC | Age: 44
End: 2024-04-19
Payer: COMMERCIAL

## 2024-04-19 VITALS
BODY MASS INDEX: 21.93 KG/M2 | WEIGHT: 130 LBS | DIASTOLIC BLOOD PRESSURE: 65 MMHG | SYSTOLIC BLOOD PRESSURE: 103 MMHG | HEART RATE: 101 BPM | HEIGHT: 64.5 IN

## 2024-04-19 DIAGNOSIS — R11.0 NAUSEA: ICD-10-CM

## 2024-04-19 LAB
CULTURE RESULTS: SIGNIFICANT CHANGE UP
SPECIMEN SOURCE: SIGNIFICANT CHANGE UP

## 2024-04-19 PROCEDURE — 99204 OFFICE O/P NEW MOD 45 MIN: CPT

## 2024-04-19 RX ORDER — TAMSULOSIN HYDROCHLORIDE 0.4 MG/1
0.4 CAPSULE ORAL
Qty: 60 | Refills: 1 | Status: ACTIVE | COMMUNITY
Start: 2024-04-19 | End: 1900-01-01

## 2024-04-19 RX ORDER — FOLIC ACID 20 MG
CAPSULE ORAL
Refills: 0 | Status: ACTIVE | COMMUNITY

## 2024-04-19 RX ORDER — ONDANSETRON 4 MG/1
4 TABLET, ORALLY DISINTEGRATING ORAL EVERY 8 HOURS
Qty: 15 | Refills: 1 | Status: ACTIVE | COMMUNITY
Start: 2024-04-19 | End: 1900-01-01

## 2024-04-21 NOTE — ASSESSMENT
[FreeTextEntry1] : Flomax - two tabs daily sent to pharmacy. Warned about possible dizziness. Also, Zofran 4 mg po prn for her nausea strain urine  IF pain well controlled and no persistent n/v/f/c, can re image in 2 wks to document for movement/passage and for resolution of hydronephrosis or worsening.  Will also fill out booking form due to delayed nephrogram and nausea from ureteral right stones.  Kidney should not remain obstructed for too long.  Understands that if any f/c/n/v, intractable pain, must head to ER ASAP. She understood and all Q's answered.

## 2024-04-21 NOTE — HISTORY OF PRESENT ILLNESS
[FreeTextEntry1] : CARY EVANS is a 44 year old F who presents with 8 d h/o rt lower quadrant pain that started in the back. To Er on 4/18/24, and Cr was 1.05/GFR 68  WBC 18 Segs 90%  UA shows dense urine 100 RBC  4/18/24  CT A/P w iv contrast - mod rt hydroureter to a 4 mm mid ureteral stone w delayed nephrogram Pt has hemangiomas and fibroids and I told her about them. She is up to date w Gyn but needs a PCP

## 2024-04-29 ENCOUNTER — APPOINTMENT (OUTPATIENT)
Dept: RADIOLOGY | Facility: CLINIC | Age: 44
End: 2024-04-29
Payer: COMMERCIAL

## 2024-04-29 ENCOUNTER — APPOINTMENT (OUTPATIENT)
Dept: ULTRASOUND IMAGING | Facility: CLINIC | Age: 44
End: 2024-04-29
Payer: COMMERCIAL

## 2024-04-29 ENCOUNTER — OUTPATIENT (OUTPATIENT)
Dept: OUTPATIENT SERVICES | Facility: HOSPITAL | Age: 44
LOS: 1 days | End: 2024-04-29
Payer: COMMERCIAL

## 2024-04-29 DIAGNOSIS — N13.30 UNSPECIFIED HYDRONEPHROSIS: ICD-10-CM

## 2024-04-29 PROCEDURE — 74018 RADEX ABDOMEN 1 VIEW: CPT

## 2024-04-29 PROCEDURE — 74018 RADEX ABDOMEN 1 VIEW: CPT | Mod: 26

## 2024-04-29 PROCEDURE — 76770 US EXAM ABDO BACK WALL COMP: CPT | Mod: 26

## 2024-04-29 PROCEDURE — 76770 US EXAM ABDO BACK WALL COMP: CPT

## 2024-05-02 ENCOUNTER — APPOINTMENT (OUTPATIENT)
Dept: CT IMAGING | Facility: CLINIC | Age: 44
End: 2024-05-02
Payer: COMMERCIAL

## 2024-05-02 ENCOUNTER — OUTPATIENT (OUTPATIENT)
Dept: OUTPATIENT SERVICES | Facility: HOSPITAL | Age: 44
LOS: 1 days | End: 2024-05-02
Payer: COMMERCIAL

## 2024-05-02 DIAGNOSIS — N13.30 UNSPECIFIED HYDRONEPHROSIS: ICD-10-CM

## 2024-05-02 PROCEDURE — 74176 CT ABD & PELVIS W/O CONTRAST: CPT | Mod: 26

## 2024-05-02 PROCEDURE — 74176 CT ABD & PELVIS W/O CONTRAST: CPT

## 2024-05-14 ENCOUNTER — OUTPATIENT (OUTPATIENT)
Dept: OUTPATIENT SERVICES | Facility: HOSPITAL | Age: 44
LOS: 1 days | End: 2024-05-14
Payer: COMMERCIAL

## 2024-05-14 VITALS
WEIGHT: 130.07 LBS | SYSTOLIC BLOOD PRESSURE: 132 MMHG | TEMPERATURE: 98 F | HEART RATE: 99 BPM | RESPIRATION RATE: 20 BRPM | DIASTOLIC BLOOD PRESSURE: 84 MMHG | HEIGHT: 64 IN | OXYGEN SATURATION: 98 %

## 2024-05-14 DIAGNOSIS — Z98.890 OTHER SPECIFIED POSTPROCEDURAL STATES: Chronic | ICD-10-CM

## 2024-05-14 DIAGNOSIS — N20.1 CALCULUS OF URETER: ICD-10-CM

## 2024-05-14 DIAGNOSIS — N20.0 CALCULUS OF KIDNEY: ICD-10-CM

## 2024-05-14 LAB
ANION GAP SERPL CALC-SCNC: 13 MMOL/L — SIGNIFICANT CHANGE UP (ref 5–17)
BUN SERPL-MCNC: 25 MG/DL — HIGH (ref 7–23)
CALCIUM SERPL-MCNC: 9.9 MG/DL — SIGNIFICANT CHANGE UP (ref 8.4–10.5)
CHLORIDE SERPL-SCNC: 103 MMOL/L — SIGNIFICANT CHANGE UP (ref 96–108)
CO2 SERPL-SCNC: 23 MMOL/L — SIGNIFICANT CHANGE UP (ref 22–31)
CREAT SERPL-MCNC: 0.68 MG/DL — SIGNIFICANT CHANGE UP (ref 0.5–1.3)
EGFR: 111 ML/MIN/1.73M2 — SIGNIFICANT CHANGE UP
GLUCOSE SERPL-MCNC: 113 MG/DL — HIGH (ref 70–99)
HCT VFR BLD CALC: 36.4 % — SIGNIFICANT CHANGE UP (ref 34.5–45)
HGB BLD-MCNC: 12 G/DL — SIGNIFICANT CHANGE UP (ref 11.5–15.5)
MCHC RBC-ENTMCNC: 29.8 PG — SIGNIFICANT CHANGE UP (ref 27–34)
MCHC RBC-ENTMCNC: 33 GM/DL — SIGNIFICANT CHANGE UP (ref 32–36)
MCV RBC AUTO: 90.3 FL — SIGNIFICANT CHANGE UP (ref 80–100)
NRBC # BLD: 0 /100 WBCS — SIGNIFICANT CHANGE UP (ref 0–0)
PLATELET # BLD AUTO: 387 K/UL — SIGNIFICANT CHANGE UP (ref 150–400)
POTASSIUM SERPL-MCNC: 3.5 MMOL/L — SIGNIFICANT CHANGE UP (ref 3.5–5.3)
POTASSIUM SERPL-SCNC: 3.5 MMOL/L — SIGNIFICANT CHANGE UP (ref 3.5–5.3)
RBC # BLD: 4.03 M/UL — SIGNIFICANT CHANGE UP (ref 3.8–5.2)
RBC # FLD: 13.9 % — SIGNIFICANT CHANGE UP (ref 10.3–14.5)
SODIUM SERPL-SCNC: 139 MMOL/L — SIGNIFICANT CHANGE UP (ref 135–145)
WBC # BLD: 11.21 K/UL — HIGH (ref 3.8–10.5)
WBC # FLD AUTO: 11.21 K/UL — HIGH (ref 3.8–10.5)

## 2024-05-14 PROCEDURE — 80048 BASIC METABOLIC PNL TOTAL CA: CPT

## 2024-05-14 PROCEDURE — G0463: CPT

## 2024-05-14 PROCEDURE — 87086 URINE CULTURE/COLONY COUNT: CPT

## 2024-05-14 PROCEDURE — 85027 COMPLETE CBC AUTOMATED: CPT

## 2024-05-14 PROCEDURE — 86803 HEPATITIS C AB TEST: CPT

## 2024-05-14 RX ORDER — SODIUM CHLORIDE 9 MG/ML
1000 INJECTION, SOLUTION INTRAVENOUS
Refills: 0 | Status: DISCONTINUED | OUTPATIENT
Start: 2024-05-21 | End: 2024-06-04

## 2024-05-14 NOTE — H&P PST ADULT - PROBLEM SELECTOR PLAN 1
Cystoscopy , Right ureteroscopy , Laser lithotripsy , Right retrograde pyelogram and insertion of right ureteral stent on 5/21/2024.   ? Cystoscopy , Right ureteroscopy , Laser lithotripsy , Right retrograde pyelogram and insertion of right ureteral stent on 5/21/2024.   UCG - day of surgery

## 2024-05-14 NOTE — H&P PST ADULT - ASSESSMENT
Airway:  normal    Mallampati-       Dental: Patient denies loose teeth    Activity :  dasi mets :     Airway:  normal    Mallampati- 3      Dental: Patient denies loose teeth    Activity : ADL , Walk daily   dasi mets : 7 dasi mets

## 2024-05-14 NOTE — H&P PST ADULT - SPO2 (%)
I spoke with parents over the phone.  Made plan of Budesonide BID, albuterol PRN.        
Provider E-Visit time total (minutes): 15  
98

## 2024-05-14 NOTE — H&P PST ADULT - NSICDXPASTMEDICALHX_GEN_ALL_CORE_FT
PAST MEDICAL HISTORY:  Appendicolith     Calculus of kidney     Hydronephrosis, right     Liver hemangioma     Migraines     Uterine fibroid

## 2024-05-14 NOTE — H&P PST ADULT - HISTORY OF PRESENT ILLNESS
44 yr old female , with  h/o rt lower quadrant pain that started in the back - 4/18/2024- Came to ER -  CT A/P w iv contrast - mod rt hydroureter to a 4 mm mid ureteral stone w delayed nephrogram. Now coming in for Cystoscopy , Right ureteroscopy , Laser lithotripsy , Right retrograde pyelogram and insertion of right ureteral stent on 5/21/2024.   ?

## 2024-05-15 LAB
CULTURE RESULTS: SIGNIFICANT CHANGE UP
HCV AB S/CO SERPL IA: 0.09 S/CO — SIGNIFICANT CHANGE UP (ref 0–0.99)
HCV AB SERPL-IMP: SIGNIFICANT CHANGE UP
SPECIMEN SOURCE: SIGNIFICANT CHANGE UP

## 2024-05-16 ENCOUNTER — APPOINTMENT (OUTPATIENT)
Dept: INTERNAL MEDICINE | Facility: CLINIC | Age: 44
End: 2024-05-16
Payer: COMMERCIAL

## 2024-05-16 VITALS
OXYGEN SATURATION: 97 % | DIASTOLIC BLOOD PRESSURE: 80 MMHG | TEMPERATURE: 97.9 F | SYSTOLIC BLOOD PRESSURE: 122 MMHG | HEIGHT: 64.5 IN | WEIGHT: 132 LBS | HEART RATE: 99 BPM | RESPIRATION RATE: 18 BRPM | BODY MASS INDEX: 22.26 KG/M2

## 2024-05-16 DIAGNOSIS — N13.30 UNSPECIFIED HYDRONEPHROSIS: ICD-10-CM

## 2024-05-16 DIAGNOSIS — D72.829 ELEVATED WHITE BLOOD CELL COUNT, UNSPECIFIED: ICD-10-CM

## 2024-05-16 DIAGNOSIS — Z01.818 ENCOUNTER FOR OTHER PREPROCEDURAL EXAMINATION: ICD-10-CM

## 2024-05-16 PROCEDURE — 99213 OFFICE O/P EST LOW 20 MIN: CPT | Mod: 25

## 2024-05-16 PROCEDURE — 36415 COLL VENOUS BLD VENIPUNCTURE: CPT

## 2024-05-16 PROCEDURE — 93000 ELECTROCARDIOGRAM COMPLETE: CPT

## 2024-05-18 LAB
APTT BLD: 30.9 SEC
BASOPHILS # BLD AUTO: 0.06 K/UL
BASOPHILS NFR BLD AUTO: 0.8 %
EOSINOPHIL # BLD AUTO: 0.19 K/UL
EOSINOPHIL NFR BLD AUTO: 2.5 %
HCG SERPL-MCNC: <1 MIU/ML
HCT VFR BLD CALC: 40.3 %
HGB BLD-MCNC: 12.7 G/DL
IMM GRANULOCYTES NFR BLD AUTO: 0.5 %
INR PPP: 0.94 RATIO
LYMPHOCYTES # BLD AUTO: 2.37 K/UL
LYMPHOCYTES NFR BLD AUTO: 30.6 %
MAN DIFF?: NORMAL
MCHC RBC-ENTMCNC: 30 PG
MCHC RBC-ENTMCNC: 31.5 GM/DL
MCV RBC AUTO: 95.3 FL
MONOCYTES # BLD AUTO: 0.57 K/UL
MONOCYTES NFR BLD AUTO: 7.4 %
NEUTROPHILS # BLD AUTO: 4.52 K/UL
NEUTROPHILS NFR BLD AUTO: 58.2 %
PLATELET # BLD AUTO: 365 K/UL
PT BLD: 10.6 SEC
RBC # BLD: 4.23 M/UL
RBC # FLD: 14.7 %
WBC # FLD AUTO: 7.75 K/UL

## 2024-05-18 NOTE — HISTORY OF PRESENT ILLNESS
[FreeTextEntry1] : Preop clearance [de-identified] : Patient presents for presurgical clearance for right renal stone removal.  Was in ER 4/18/2024 for right lower quadrant pain moderate right hydroureter to a 4 mm mild ureteral stone with delayed nephrogram.  Now feeling better minimal right flank discomfort denies urinary symptoms fever nausea vomiting.  Patient had elevated WBCs in ER 18 now 11 UA shows large RBCs

## 2024-05-18 NOTE — ASSESSMENT
[FreeTextEntry1] : Preoperative clearance for CBC repeated due to elevated WBCs check coags hCG EKG done today unremarkable Leukocytosis labs repeated likely secondary to hydronephrosis calculus.  Clinically stable Renal calculus awaiting intervention surgical Clearance pending labs  Addendum 5/18/2024 9:27 AM Labs reviewed: CBC normal no absolute contraindications to proposed procedure Post-Care Instructions: Patient instructed to not lie down for 4 hours and limit physical activity for 24 hours. Patient verbalized understanding.

## 2024-05-20 ENCOUNTER — TRANSCRIPTION ENCOUNTER (OUTPATIENT)
Age: 44
End: 2024-05-20

## 2024-05-21 ENCOUNTER — OUTPATIENT (OUTPATIENT)
Dept: OUTPATIENT SERVICES | Facility: HOSPITAL | Age: 44
LOS: 1 days | End: 2024-05-21
Payer: COMMERCIAL

## 2024-05-21 ENCOUNTER — APPOINTMENT (OUTPATIENT)
Dept: UROLOGY | Facility: HOSPITAL | Age: 44
End: 2024-05-21

## 2024-05-21 ENCOUNTER — RESULT REVIEW (OUTPATIENT)
Age: 44
End: 2024-05-21

## 2024-05-21 ENCOUNTER — TRANSCRIPTION ENCOUNTER (OUTPATIENT)
Age: 44
End: 2024-05-21

## 2024-05-21 VITALS
RESPIRATION RATE: 18 BRPM | DIASTOLIC BLOOD PRESSURE: 70 MMHG | HEART RATE: 85 BPM | SYSTOLIC BLOOD PRESSURE: 103 MMHG | OXYGEN SATURATION: 100 %

## 2024-05-21 VITALS
HEIGHT: 64 IN | TEMPERATURE: 98 F | HEART RATE: 90 BPM | RESPIRATION RATE: 16 BRPM | SYSTOLIC BLOOD PRESSURE: 139 MMHG | WEIGHT: 130.07 LBS | OXYGEN SATURATION: 100 % | DIASTOLIC BLOOD PRESSURE: 81 MMHG

## 2024-05-21 DIAGNOSIS — N20.1 CALCULUS OF URETER: ICD-10-CM

## 2024-05-21 DIAGNOSIS — Z98.890 OTHER SPECIFIED POSTPROCEDURAL STATES: Chronic | ICD-10-CM

## 2024-05-21 PROCEDURE — 74420 UROGRAPHY RTRGR +-KUB: CPT | Mod: 26

## 2024-05-21 PROCEDURE — 52356 CYSTO/URETERO W/LITHOTRIPSY: CPT | Mod: RT

## 2024-05-21 PROCEDURE — 88300 SURGICAL PATH GROSS: CPT | Mod: 26

## 2024-05-21 DEVICE — STONE BASKET ZEROTIP NITINOL 4-WIRE 1.9FR 120CM X 12MM: Type: IMPLANTABLE DEVICE | Status: FUNCTIONAL

## 2024-05-21 DEVICE — GUIDEWIRE SENSOR DUAL-FLEX NITINOL STRAIGHT .035" X 150CM: Type: IMPLANTABLE DEVICE | Status: FUNCTIONAL

## 2024-05-21 DEVICE — URETERAL CATH OPEN END 5FR 70CM: Type: IMPLANTABLE DEVICE | Status: FUNCTIONAL

## 2024-05-21 DEVICE — STENT URET 7FR 24CM: Type: IMPLANTABLE DEVICE | Status: FUNCTIONAL

## 2024-05-21 DEVICE — LASER FIBER SOLTIVE 150 BALL TIP: Type: IMPLANTABLE DEVICE | Status: FUNCTIONAL

## 2024-05-21 RX ORDER — ONDANSETRON 8 MG/1
4 TABLET, FILM COATED ORAL ONCE
Refills: 0 | Status: DISCONTINUED | OUTPATIENT
Start: 2024-05-21 | End: 2024-06-04

## 2024-05-21 RX ORDER — HYDROMORPHONE HYDROCHLORIDE 2 MG/ML
1 INJECTION INTRAMUSCULAR; INTRAVENOUS; SUBCUTANEOUS
Refills: 0 | Status: DISCONTINUED | OUTPATIENT
Start: 2024-05-21 | End: 2024-05-21

## 2024-05-21 RX ORDER — LIDOCAINE HCL 20 MG/ML
0.2 VIAL (ML) INJECTION ONCE
Refills: 0 | Status: COMPLETED | OUTPATIENT
Start: 2024-05-21 | End: 2024-05-21

## 2024-05-21 RX ORDER — HYDROMORPHONE HYDROCHLORIDE 2 MG/ML
0.5 INJECTION INTRAMUSCULAR; INTRAVENOUS; SUBCUTANEOUS
Refills: 0 | Status: DISCONTINUED | OUTPATIENT
Start: 2024-05-21 | End: 2024-05-21

## 2024-05-21 RX ORDER — CIPROFLOXACIN LACTATE 400MG/40ML
400 VIAL (ML) INTRAVENOUS ONCE
Refills: 0 | Status: COMPLETED | OUTPATIENT
Start: 2024-05-21 | End: 2024-05-21

## 2024-05-21 RX ADMIN — SODIUM CHLORIDE 100 MILLILITER(S): 9 INJECTION, SOLUTION INTRAVENOUS at 12:36

## 2024-05-21 NOTE — ASU DISCHARGE PLAN (ADULT/PEDIATRIC) - ASU DC SPECIAL INSTRUCTIONSFT
STENT: You may have an internal stent (a hollow tube that runs from the kidney to your bladder) after your procedure, which helps urine drain from the kidney to your bladder. Some patients experience urinary frequency, burning, or even back pain (especially with urination). These sensations will gradually get better. Increasing your fluid intake can also improve these symptoms. While the stent is in place, your urine may continue to be bloody. This stent is temporary and must be removed by your urologist as an outpatient with in 3 months unless otherwise specified. If your stent is on a string, it is secured to your leg or genitalia with an adhesive bandage. Do not pull on the string, do not remove the bandage, do not insert anything intravaginally/intraurethrally, and do not engage in sexual intercourse until after the stent is removed at your post-operative appointment.  GENERAL: It is common to have blood in your urine after your procedure. It may be pink or even red; inform your doctor if you have a significant amount of clot in the urine or if you are unable to void at all. The urine may clear and then become bloody again especially as you are more physically active.  BATHING: You may shower or bathe.  DIET: You may resume your regular diet and regular medication regimen.  PAIN: You may take Tylenol (acetaminophen) 650-975mg and/or Motrin (ibuprofen) 400-600mg, both available over the counter, for pain every 6 hours as needed. Do not exceed 4000mg of Tylenol (acetaminophen) daily. You may alternate these medications such that you take one or the other every 3 hours for around the clock pain coverage.   ANTIBIOTICS: You may be given a prescription for an antibiotic, please take this medication as instructed and be sure to complete the entire course.  STOOL SOFTENERS: Do not allow yourself to become constipated as straining may cause bleeding. Take stool softeners or a laxative (ex. Miralax, Colace, Senokot, ExLax, etc), available over the counter, if needed.  ACTIVITY: No heavy lifting or strenuous exercise until you are evaluated at your post-operative appointment. Otherwise, you may return to your usual level of physical activity.  FOLLOW-UP: If you did not already schedule your post-operative appointment, please call your urologist to schedule a follow-up appointment.  CALL YOUR UROLOGIST IF: You have any bleeding that does not stop, inability to void >8 hours, fever over 100.4 F, chills, persistent nausea/vomiting, changes in your incision concerning for infection, or if your pain is not controlled on your discharge pain medications.

## 2024-05-21 NOTE — ASU PATIENT PROFILE, ADULT - FALL HARM RISK - UNIVERSAL INTERVENTIONS
Bed in lowest position, wheels locked, appropriate side rails in place/Call bell, personal items and telephone in reach/Instruct patient to call for assistance before getting out of bed or chair/Non-slip footwear when patient is out of bed/[place] to call system/Physically safe environment - no spills, clutter or unnecessary equipment/Purposeful Proactive Rounding/Room/bathroom lighting operational, light cord in reach

## 2024-05-21 NOTE — ASU DISCHARGE PLAN (ADULT/PEDIATRIC) - NURSING INSTRUCTIONS
Next dose of tylenol at/after_0915p. Do not exceed 4000mg in a 24hour  period. Every 6hours as needed.   Next dose of motrin at/after 10pm

## 2024-05-21 NOTE — ASU PREOP CHECKLIST - PATIENT PROBLEMS/NEEDS
Lab Results   Component Value Date    HGBA1C 6.6 (H) 07/07/2022       Recent Labs     08/23/23  2103 08/24/23  0719 08/24/23  1130 08/24/23  1712   POCGLU 138 118 148* 211*       Blood Sugar Average: Last 72 hrs:  (P) [id number] Patient expressed no known problems or needs

## 2024-05-21 NOTE — PRE-ANESTHESIA EVALUATION ADULT - MALLAMPATI CLASS
February 6, 2023      Anjelbrian Payton  [address] [facility] [address]        Dear Anjel,    Below are the results of your recent labs:    Results for orders placed or performed in visit on 02/06/23   UA with Microscopic reflex to Culture     Status: Abnormal    Specimen: Urine, Midstream   Result Value Ref Range    Color Urine Yellow Colorless, Straw, Light Yellow, Yellow    Appearance Urine Clear Clear    Glucose Urine Negative Negative mg/dL    Bilirubin Urine Small (A) Negative    Ketones Urine 15 (A) Negative mg/dL    Specific Gravity Urine 1.025 1.005 - 1.030    Blood Urine Negative Negative    pH Urine 5.5 5.0 - 9.0    Protein Albumin Urine Negative Negative mg/dL    Urobilinogen Urine Normal Normal, 2.0 mg/dL    Nitrite Urine Negative Negative    Leukocyte Esterase Urine Negative Negative    RBC Urine <1 <=2 /HPF    WBC Urine <1 <=5 /HPF    Narrative    Urine Culture not indicated   Potassium     Status: Normal   Result Value Ref Range    Potassium 3.9 3.4 - 5.3 mmol/L   Extra Tube     Status: None (In process)    Narrative    The following orders were created for panel order Extra Tube.  Procedure                               Abnormality         Status                     ---------                               -----------         ------                     Extra Serum Separator Tu...[[id number]]                      In process                   Please view results for these tests on the individual orders.        Your potassium is normal.  Your ketones are still minimally elevated but these are not concerning.  We can discuss this when you return for a recheck in 1 month.    Sincerely,        Sanjeev Burns MD  Internal Medicine  [facility] and [facility]                  
Class II - visualization of the soft palate, fauces, and uvula

## 2024-05-21 NOTE — PRE-ANESTHESIA EVALUATION ADULT - NSPROPOSEDPROCEDFT_GEN_ALL_CORE
Cystoscopy , Right ureteroscopy , Laser lithotripsy , Right retrograde pyelogram and insertion of right ureteral stent

## 2024-05-22 PROBLEM — N13.30 UNSPECIFIED HYDRONEPHROSIS: Chronic | Status: ACTIVE | Noted: 2024-05-14

## 2024-05-22 PROBLEM — G43.909 MIGRAINE, UNSPECIFIED, NOT INTRACTABLE, WITHOUT STATUS MIGRAINOSUS: Chronic | Status: ACTIVE | Noted: 2024-05-14

## 2024-05-22 PROBLEM — D18.03 HEMANGIOMA OF INTRA-ABDOMINAL STRUCTURES: Chronic | Status: ACTIVE | Noted: 2024-05-14

## 2024-05-22 PROBLEM — K38.9 DISEASE OF APPENDIX, UNSPECIFIED: Chronic | Status: ACTIVE | Noted: 2024-05-14

## 2024-05-22 PROBLEM — N20.0 CALCULUS OF KIDNEY: Chronic | Status: ACTIVE | Noted: 2024-05-14

## 2024-05-22 PROBLEM — D25.9 LEIOMYOMA OF UTERUS, UNSPECIFIED: Chronic | Status: ACTIVE | Noted: 2024-05-14

## 2024-05-22 PROCEDURE — 76000 FLUOROSCOPY <1 HR PHYS/QHP: CPT

## 2024-05-22 PROCEDURE — C1889: CPT

## 2024-05-22 PROCEDURE — 82365 CALCULUS SPECTROSCOPY: CPT

## 2024-05-22 PROCEDURE — C1769: CPT

## 2024-05-22 PROCEDURE — C1758: CPT

## 2024-05-22 PROCEDURE — C2625: CPT

## 2024-05-22 PROCEDURE — 88300 SURGICAL PATH GROSS: CPT

## 2024-05-22 PROCEDURE — 52356 CYSTO/URETERO W/LITHOTRIPSY: CPT | Mod: RT

## 2024-05-23 LAB — SURGICAL PATHOLOGY STUDY: SIGNIFICANT CHANGE UP

## 2024-05-29 ENCOUNTER — APPOINTMENT (OUTPATIENT)
Dept: UROLOGY | Facility: CLINIC | Age: 44
End: 2024-05-29
Payer: COMMERCIAL

## 2024-05-29 ENCOUNTER — OUTPATIENT (OUTPATIENT)
Dept: OUTPATIENT SERVICES | Facility: HOSPITAL | Age: 44
LOS: 1 days | End: 2024-05-29
Payer: COMMERCIAL

## 2024-05-29 DIAGNOSIS — Z98.890 OTHER SPECIFIED POSTPROCEDURAL STATES: Chronic | ICD-10-CM

## 2024-05-29 DIAGNOSIS — R35.0 FREQUENCY OF MICTURITION: ICD-10-CM

## 2024-05-29 DIAGNOSIS — N20.0 CALCULUS OF KIDNEY: ICD-10-CM

## 2024-05-29 DIAGNOSIS — Z96.0 PRESENCE OF UROGENITAL IMPLANTS: ICD-10-CM

## 2024-05-29 DIAGNOSIS — N20.1 CALCULUS OF URETER: ICD-10-CM

## 2024-05-29 LAB
CELL MATERIAL STONE EST-MCNT: SIGNIFICANT CHANGE UP
LABORATORY COMMENT REPORT: SIGNIFICANT CHANGE UP
NIDUS STONE QN: SIGNIFICANT CHANGE UP

## 2024-05-29 PROCEDURE — 52310 CYSTOSCOPY AND TREATMENT: CPT

## 2024-05-29 PROCEDURE — 99212 OFFICE O/P EST SF 10 MIN: CPT | Mod: 25

## 2024-05-30 ENCOUNTER — EMERGENCY (EMERGENCY)
Facility: HOSPITAL | Age: 44
LOS: 0 days | Discharge: HOME | End: 2024-05-30
Attending: STUDENT IN AN ORGANIZED HEALTH CARE EDUCATION/TRAINING PROGRAM
Payer: COMMERCIAL

## 2024-05-30 ENCOUNTER — NON-APPOINTMENT (OUTPATIENT)
Age: 44
End: 2024-05-30

## 2024-05-30 VITALS
DIASTOLIC BLOOD PRESSURE: 70 MMHG | RESPIRATION RATE: 18 BRPM | HEART RATE: 89 BPM | TEMPERATURE: 98 F | SYSTOLIC BLOOD PRESSURE: 126 MMHG | OXYGEN SATURATION: 97 %

## 2024-05-30 VITALS
WEIGHT: 132.06 LBS | HEIGHT: 64 IN | TEMPERATURE: 98 F | DIASTOLIC BLOOD PRESSURE: 83 MMHG | OXYGEN SATURATION: 97 % | SYSTOLIC BLOOD PRESSURE: 133 MMHG | RESPIRATION RATE: 19 BRPM | HEART RATE: 102 BPM

## 2024-05-30 DIAGNOSIS — N39.0 URINARY TRACT INFECTION, SITE NOT SPECIFIED: ICD-10-CM

## 2024-05-30 DIAGNOSIS — Z98.890 OTHER SPECIFIED POSTPROCEDURAL STATES: Chronic | ICD-10-CM

## 2024-05-30 DIAGNOSIS — B96.20 UNSPECIFIED ESCHERICHIA COLI [E. COLI] AS THE CAUSE OF DISEASES CLASSIFIED ELSEWHERE: ICD-10-CM

## 2024-05-30 DIAGNOSIS — R31.9 HEMATURIA, UNSPECIFIED: ICD-10-CM

## 2024-05-30 DIAGNOSIS — Z88.0 ALLERGY STATUS TO PENICILLIN: ICD-10-CM

## 2024-05-30 DIAGNOSIS — R11.0 NAUSEA: ICD-10-CM

## 2024-05-30 DIAGNOSIS — Z98.890 OTHER SPECIFIED POSTPROCEDURAL STATES: ICD-10-CM

## 2024-05-30 DIAGNOSIS — R10.9 UNSPECIFIED ABDOMINAL PAIN: ICD-10-CM

## 2024-05-30 DIAGNOSIS — N13.30 UNSPECIFIED HYDRONEPHROSIS: ICD-10-CM

## 2024-05-30 LAB
ALBUMIN SERPL ELPH-MCNC: 3.6 G/DL — SIGNIFICANT CHANGE UP (ref 3.3–5)
ALP SERPL-CCNC: 72 U/L — SIGNIFICANT CHANGE UP (ref 40–120)
ALT FLD-CCNC: 14 U/L — SIGNIFICANT CHANGE UP (ref 12–78)
ANION GAP SERPL CALC-SCNC: 4 MMOL/L — LOW (ref 5–17)
APPEARANCE UR: ABNORMAL
AST SERPL-CCNC: 14 U/L — LOW (ref 15–37)
BACTERIA # UR AUTO: ABNORMAL /HPF
BASOPHILS # BLD AUTO: 0.05 K/UL — SIGNIFICANT CHANGE UP (ref 0–0.2)
BASOPHILS NFR BLD AUTO: 0.9 % — SIGNIFICANT CHANGE UP (ref 0–2)
BILIRUB SERPL-MCNC: 0.4 MG/DL — SIGNIFICANT CHANGE UP (ref 0.2–1.2)
BILIRUB UR-MCNC: NEGATIVE — SIGNIFICANT CHANGE UP
BUN SERPL-MCNC: 22 MG/DL — SIGNIFICANT CHANGE UP (ref 7–23)
CALCIUM SERPL-MCNC: 9 MG/DL — SIGNIFICANT CHANGE UP (ref 8.5–10.1)
CHLORIDE SERPL-SCNC: 109 MMOL/L — HIGH (ref 96–108)
CO2 SERPL-SCNC: 27 MMOL/L — SIGNIFICANT CHANGE UP (ref 22–31)
COLOR SPEC: YELLOW — SIGNIFICANT CHANGE UP
CREAT SERPL-MCNC: 0.78 MG/DL — SIGNIFICANT CHANGE UP (ref 0.5–1.3)
DIFF PNL FLD: ABNORMAL
EGFR: 97 ML/MIN/1.73M2 — SIGNIFICANT CHANGE UP
EOSINOPHIL # BLD AUTO: 0.23 K/UL — SIGNIFICANT CHANGE UP (ref 0–0.5)
EOSINOPHIL NFR BLD AUTO: 3.9 % — SIGNIFICANT CHANGE UP (ref 0–6)
EPI CELLS # UR: PRESENT
GLUCOSE SERPL-MCNC: 106 MG/DL — HIGH (ref 70–99)
GLUCOSE UR QL: NEGATIVE MG/DL — SIGNIFICANT CHANGE UP
HCG SERPL-ACNC: <1 MIU/ML — SIGNIFICANT CHANGE UP
HCT VFR BLD CALC: 33.3 % — LOW (ref 34.5–45)
HGB BLD-MCNC: 11.2 G/DL — LOW (ref 11.5–15.5)
IMM GRANULOCYTES NFR BLD AUTO: 0.3 % — SIGNIFICANT CHANGE UP (ref 0–0.9)
KETONES UR-MCNC: NEGATIVE MG/DL — SIGNIFICANT CHANGE UP
LEUKOCYTE ESTERASE UR-ACNC: ABNORMAL
LYMPHOCYTES # BLD AUTO: 2.25 K/UL — SIGNIFICANT CHANGE UP (ref 1–3.3)
LYMPHOCYTES # BLD AUTO: 38.6 % — SIGNIFICANT CHANGE UP (ref 13–44)
MCHC RBC-ENTMCNC: 30 PG — SIGNIFICANT CHANGE UP (ref 27–34)
MCHC RBC-ENTMCNC: 33.6 G/DL — SIGNIFICANT CHANGE UP (ref 32–36)
MCV RBC AUTO: 89.3 FL — SIGNIFICANT CHANGE UP (ref 80–100)
MONOCYTES # BLD AUTO: 0.59 K/UL — SIGNIFICANT CHANGE UP (ref 0–0.9)
MONOCYTES NFR BLD AUTO: 10.1 % — SIGNIFICANT CHANGE UP (ref 2–14)
NEUTROPHILS # BLD AUTO: 2.69 K/UL — SIGNIFICANT CHANGE UP (ref 1.8–7.4)
NEUTROPHILS NFR BLD AUTO: 46.2 % — SIGNIFICANT CHANGE UP (ref 43–77)
NITRITE UR-MCNC: NEGATIVE — SIGNIFICANT CHANGE UP
NRBC # BLD: 0 /100 WBCS — SIGNIFICANT CHANGE UP (ref 0–0)
PH UR: 6 — SIGNIFICANT CHANGE UP (ref 5–8)
PLATELET # BLD AUTO: 348 K/UL — SIGNIFICANT CHANGE UP (ref 150–400)
POTASSIUM SERPL-MCNC: 3.6 MMOL/L — SIGNIFICANT CHANGE UP (ref 3.5–5.3)
POTASSIUM SERPL-SCNC: 3.6 MMOL/L — SIGNIFICANT CHANGE UP (ref 3.5–5.3)
PROT SERPL-MCNC: 6.9 GM/DL — SIGNIFICANT CHANGE UP (ref 6–8.3)
PROT UR-MCNC: 100 MG/DL
RBC # BLD: 3.73 M/UL — LOW (ref 3.8–5.2)
RBC # FLD: 13.3 % — SIGNIFICANT CHANGE UP (ref 10.3–14.5)
RBC CASTS # UR COMP ASSIST: >50 /HPF — HIGH (ref 0–4)
SODIUM SERPL-SCNC: 140 MMOL/L — SIGNIFICANT CHANGE UP (ref 135–145)
SP GR SPEC: 1.02 — SIGNIFICANT CHANGE UP (ref 1–1.03)
UROBILINOGEN FLD QL: 0.2 MG/DL — SIGNIFICANT CHANGE UP (ref 0.2–1)
WBC # BLD: 5.83 K/UL — SIGNIFICANT CHANGE UP (ref 3.8–10.5)
WBC # FLD AUTO: 5.83 K/UL — SIGNIFICANT CHANGE UP (ref 3.8–10.5)
WBC UR QL: 30 /HPF — HIGH (ref 0–5)

## 2024-05-30 PROCEDURE — 99285 EMERGENCY DEPT VISIT HI MDM: CPT

## 2024-05-30 PROCEDURE — 76775 US EXAM ABDO BACK WALL LIM: CPT | Mod: 26

## 2024-05-30 RX ORDER — SODIUM CHLORIDE 9 MG/ML
1000 INJECTION INTRAMUSCULAR; INTRAVENOUS; SUBCUTANEOUS ONCE
Refills: 0 | Status: COMPLETED | OUTPATIENT
Start: 2024-05-30 | End: 2024-05-30

## 2024-05-30 RX ORDER — ONDANSETRON 8 MG/1
1 TABLET, FILM COATED ORAL
Qty: 1 | Refills: 0
Start: 2024-05-30

## 2024-05-30 RX ORDER — CEFDINIR 250 MG/5ML
1 POWDER, FOR SUSPENSION ORAL
Qty: 14 | Refills: 0
Start: 2024-05-30 | End: 2024-06-05

## 2024-05-30 RX ORDER — TAMSULOSIN HYDROCHLORIDE 0.4 MG/1
1 CAPSULE ORAL
Qty: 14 | Refills: 0
Start: 2024-05-30 | End: 2024-06-12

## 2024-05-30 RX ORDER — KETOROLAC TROMETHAMINE 30 MG/ML
1 SYRINGE (ML) INJECTION
Qty: 15 | Refills: 0
Start: 2024-05-30 | End: 2024-06-03

## 2024-05-30 RX ORDER — TAMSULOSIN HYDROCHLORIDE 0.4 MG/1
0.4 CAPSULE ORAL ONCE
Refills: 0 | Status: COMPLETED | OUTPATIENT
Start: 2024-05-30 | End: 2024-05-30

## 2024-05-30 RX ORDER — ONDANSETRON 8 MG/1
4 TABLET, FILM COATED ORAL ONCE
Refills: 0 | Status: COMPLETED | OUTPATIENT
Start: 2024-05-30 | End: 2024-05-30

## 2024-05-30 RX ORDER — CEFTRIAXONE 500 MG/1
1000 INJECTION, POWDER, FOR SOLUTION INTRAMUSCULAR; INTRAVENOUS ONCE
Refills: 0 | Status: COMPLETED | OUTPATIENT
Start: 2024-05-30 | End: 2024-05-30

## 2024-05-30 RX ORDER — KETOROLAC TROMETHAMINE 30 MG/ML
15 SYRINGE (ML) INJECTION ONCE
Refills: 0 | Status: DISCONTINUED | OUTPATIENT
Start: 2024-05-30 | End: 2024-05-30

## 2024-05-30 RX ADMIN — SODIUM CHLORIDE 1000 MILLILITER(S): 9 INJECTION INTRAMUSCULAR; INTRAVENOUS; SUBCUTANEOUS at 17:53

## 2024-05-30 RX ADMIN — CEFTRIAXONE 1000 MILLIGRAM(S): 500 INJECTION, POWDER, FOR SOLUTION INTRAMUSCULAR; INTRAVENOUS at 18:41

## 2024-05-30 RX ADMIN — CEFTRIAXONE 100 MILLIGRAM(S): 500 INJECTION, POWDER, FOR SOLUTION INTRAMUSCULAR; INTRAVENOUS at 17:52

## 2024-05-30 RX ADMIN — Medication 15 MILLIGRAM(S): at 15:26

## 2024-05-30 RX ADMIN — SODIUM CHLORIDE 1000 MILLILITER(S): 9 INJECTION INTRAMUSCULAR; INTRAVENOUS; SUBCUTANEOUS at 15:22

## 2024-05-30 RX ADMIN — ONDANSETRON 4 MILLIGRAM(S): 8 TABLET, FILM COATED ORAL at 15:26

## 2024-05-30 RX ADMIN — SODIUM CHLORIDE 1000 MILLILITER(S): 9 INJECTION INTRAMUSCULAR; INTRAVENOUS; SUBCUTANEOUS at 16:05

## 2024-05-30 RX ADMIN — Medication 15 MILLIGRAM(S): at 16:05

## 2024-05-30 RX ADMIN — TAMSULOSIN HYDROCHLORIDE 0.4 MILLIGRAM(S): 0.4 CAPSULE ORAL at 18:29

## 2024-05-30 NOTE — ED ADULT NURSE NOTE - OBJECTIVE STATEMENT
received er bed pr08 c/o nausea and generalized weakness since last night after uretal stent removal following lithotripsy to resolve r kidney stones denies fever states used zofran last night with improvement mild dysuria and supra pubic discomfort with some hematuria noted denies fever/chills

## 2024-05-30 NOTE — ED PROVIDER NOTE - CARE PROVIDER_API CALL
Paulette Cuevas.  Urology  [address] [place] [id number]  Phone: [phone]  Fax: [phone]  Follow Up Time:

## 2024-05-30 NOTE — ED ADULT TRIAGE NOTE - STATUS:
Intact Purse String (Simple) Text: Given the location of the defect and the characteristics of the surrounding skin a purse string closure was deemed most appropriate.  Undermining was performed circumferentially around the surgical defect.  A purse string suture was then placed and tightened.

## 2024-05-30 NOTE — ED ADULT TRIAGE NOTE - ESI TRIAGE ACUITY LEVEL, MLM
Physical Therapy      Precautions:  Medical precautions: Orders received, chart reviewed. Discussed with RN who reports pt is to have kyphoplasty later today and to hold PT eval for after procedure.       OBJECTIVE                      Documented in the chart in the following areas: Assessment.      Therapy procedure time and total treatment time can be found documented on the Time Entry flowsheet   3

## 2024-05-30 NOTE — ED PROVIDER NOTE - PROGRESS NOTE DETAILS
Seen by uro, rec flomax here and will follow up closely.  Will send with abx, pain meds, and zofran.  At this time patient feels well in nad.  Feels well and tolerating po.  Will dc

## 2024-05-30 NOTE — CONSULT NOTE ADULT - SUBJECTIVE AND OBJECTIVE BOX
Patient is a 44y old  Female who presents with a chief complaint of     HPI: 43 yo F pshx of ureteroscopy with R ureter stent , s/p stent removal yesterday with Dr Cuevas  presented to er with nausea, dysuria, hematuria since last night. denies vomiitng, tolerating PO. endorses some mild right flank pressure. hematuria she is not sure if it is from her period or the urine.       PAST MEDICAL & SURGICAL HISTORY:  Calculus of kidney      Liver hemangioma      Hydronephrosis, right      Uterine fibroid      Migraines      Appendicolith      H/O nasal septoplasty      S/P correction of deviated nasal septum          Review of Systems:  Negative except  as above in HPI    MEDICATIONS  (STANDING):  cefTRIAXone   IVPB 1000 milliGRAM(s) IV Intermittent once  sodium chloride 0.9% Bolus 1000 milliLiter(s) IV Bolus once    MEDICATIONS  (PRN):      Allergies    penicillin (Rash)    Intolerances        SOCIAL HISTORY           FAMILY HISTORY:      Vital Signs Last 24 Hrs  T(C): 36.7 (30 May 2024 15:50), Max: 36.9 (30 May 2024 14:23)  T(F): 98.1 (30 May 2024 15:50), Max: 98.4 (30 May 2024 14:23)  HR: 87 (30 May 2024 15:50) (87 - 102)  BP: 128/88 (30 May 2024 15:50) (128/88 - 133/83)  BP(mean): --  RR: 18 (30 May 2024 15:50) (18 - 19)  SpO2: 99% (30 May 2024 15:50) (97% - 99%)    Parameters below as of 30 May 2024 15:50  Patient On (Oxygen Delivery Method): room air        Physical Exam:  General:  Appears stated age, well-groomed, well-nourished, no distress  Eyes: EOMI, conjunctiva clear  HENT:  WNL, no JVD  Chest: no respiratory distress, no accessory muscle use  Abdomen:  soft, nondistended, nontender. mild right CVAT, no L CVAT   Neuro/Psych:  Alert, oriented to time, place and person     LABS:                        11.2   5.83  )-----------( 348      ( 30 May 2024 15:20 )             33.3     05-30    140  |  109<H>  |  22  ----------------------------<  106<H>  3.6   |  27  |  0.78    Ca    9.0      30 May 2024 15:20    TPro  6.9  /  Alb  3.6  /  TBili  0.4  /  DBili  x   /  AST  14<L>  /  ALT  14  /  AlkPhos  72  05-30      Urinalysis Basic - ( 30 May 2024 15:20 )    Color: Yellow / Appearance: Turbid / S.024 / pH: x  Gluc: 106 mg/dL / Ketone: Negative mg/dL  / Bili: Negative / Urobili: 0.2 mg/dL   Blood: x / Protein: 100 mg/dL / Nitrite: Negative   Leuk Esterase: Small / RBC: >50 /HPF / WBC 30 /HPF   Sq Epi: x / Non Sq Epi: x / Bacteria: Moderate /HPF          RADIOLOGY & ADDITIONAL STUDIES:  < from: US Renal (24 @ 16:30) >  visualized. Prevoid volume: 169 cc. Postvoid volume: 7 cc.    IMPRESSION:  Mild to moderate right hydronephrosis.          --- End of Report ---    < end of copied text >      A/P:  43 yo F pshx of ureteroscopy with R ureter stent , s/p stent removal yesterday with Dr Cuevas  presented to er with nausea, dysuria, hematuria since last night.  UA+, renal us with persistent mild to moderate R hydro. afebrile, w/o leukocytosis. Cr wnl   s/p rocephin, 2L bolus and toradol.   -advise c/w antibiotics, please send ucx  -start on flomax, c/w 1-2 weeks  -PO analgesics with ketorolac prn   -encourage PO hydration   -no acute urological intervention, can Follow up as an outpt with Dr Cuevas  -case discussed with Dr Cuevas   -       Patient is a 44y old  Female who presents with a chief complaint of     HPI: 45 yo F pshx of R ureteroscopy with stone removal w/ R ureter stent placement , s/p stent removal yesterday with Dr Cuevas  presented to er with nausea, dysuria, hematuria since last night. denies vomiting tolerating PO. endorses some mild right flank pressure. hematuria she is not sure if it is from her period or the urine.       PAST MEDICAL & SURGICAL HISTORY:  Calculus of kidney      Liver hemangioma      Hydronephrosis, right      Uterine fibroid      Migraines      Appendicolith      H/O nasal septoplasty      S/P correction of deviated nasal septum          Review of Systems:  Negative except  as above in HPI    MEDICATIONS  (STANDING):  cefTRIAXone   IVPB 1000 milliGRAM(s) IV Intermittent once  sodium chloride 0.9% Bolus 1000 milliLiter(s) IV Bolus once    MEDICATIONS  (PRN):      Allergies    penicillin (Rash)    Intolerances        SOCIAL HISTORY           FAMILY HISTORY:      Vital Signs Last 24 Hrs  T(C): 36.7 (30 May 2024 15:50), Max: 36.9 (30 May 2024 14:23)  T(F): 98.1 (30 May 2024 15:50), Max: 98.4 (30 May 2024 14:23)  HR: 87 (30 May 2024 15:50) (87 - 102)  BP: 128/88 (30 May 2024 15:50) (128/88 - 133/83)  BP(mean): --  RR: 18 (30 May 2024 15:50) (18 - 19)  SpO2: 99% (30 May 2024 15:50) (97% - 99%)    Parameters below as of 30 May 2024 15:50  Patient On (Oxygen Delivery Method): room air        Physical Exam:  General:  Appears stated age, well-groomed, well-nourished, no distress  Eyes: EOMI, conjunctiva clear  HENT:  WNL, no JVD  Chest: no respiratory distress, no accessory muscle use  Abdomen:  soft, nondistended, nontender. mild right CVAT, no L CVAT   Neuro/Psych:  Alert, oriented to time, place and person     LABS:                        11.2   5.83  )-----------( 348      ( 30 May 2024 15:20 )             33.3     05-30    140  |  109<H>  |  22  ----------------------------<  106<H>  3.6   |  27  |  0.78    Ca    9.0      30 May 2024 15:20    TPro  6.9  /  Alb  3.6  /  TBili  0.4  /  DBili  x   /  AST  14<L>  /  ALT  14  /  AlkPhos  72  05-30      Urinalysis Basic - ( 30 May 2024 15:20 )    Color: Yellow / Appearance: Turbid / S.024 / pH: x  Gluc: 106 mg/dL / Ketone: Negative mg/dL  / Bili: Negative / Urobili: 0.2 mg/dL   Blood: x / Protein: 100 mg/dL / Nitrite: Negative   Leuk Esterase: Small / RBC: >50 /HPF / WBC 30 /HPF   Sq Epi: x / Non Sq Epi: x / Bacteria: Moderate /HPF          RADIOLOGY & ADDITIONAL STUDIES:  < from: US Renal (24 @ 16:30) >  visualized. Prevoid volume: 169 cc. Postvoid volume: 7 cc.    IMPRESSION:  Mild to moderate right hydronephrosis.          --- End of Report ---    < end of copied text >      A/P:  45 yo F pshx of ureteroscopy with R ureter stent , s/p stent removal yesterday with Dr Cuevas  presented to er with nausea, dysuria, hematuria since last night.  UA+, renal us with persistent mild to moderate R hydro. afebrile, w/o leukocytosis. Cr wnl   s/p rocephin, 2L bolus and toradol.   -advise c/w antibiotics, please send ucx  -start on flomax, c/w 1-2 weeks  -PO analgesics with ketorolac prn   -encourage PO hydration   -no acute urological intervention, can Follow up as an outpt with Dr Cuevas  -case discussed with Dr Cuevas

## 2024-05-30 NOTE — ED ADULT TRIAGE NOTE - LOCATION:
Detail Level: Detailed Body Location Override (Optional - Billing Will Still Be Based On Selected Body Map Location If Applicable): right lateral zygoma X Size Of Lesion In Cm (Optional): 0 Incorporate Mauc In Note: Yes Right arm;

## 2024-05-30 NOTE — ED ADULT TRIAGE NOTE - SOURCE OF INFORMATION
Non-Violent / Non Self-Destructive restraints continued.bilaterally Soft Wrist applied.  Restraints Continued due to the following patient behaviors: pulling NG/OG Tube and pulling at oral ETT. Less restrictive alternatives to restraints which were considered including Restraint Alternatives Attempted: Alarm Systems, Adjust Devices, Adjust Environment, Comfort/Pain Control, Family Input, Frequent Rounding, Move Close to Station, Medical Regimen Assessed.  patient educated on reason for restraints and criteria for release and demonstrated knowledge.          Patient

## 2024-05-30 NOTE — CONSULT NOTE ADULT - NS ATTEND AMEND GEN_ALL_CORE FT
Pt is s/p ureteral and renal stone removal and stent placement.  In office yesterday for cysto/stent removal. Both procedures very straight forward and uncomplicated.  +N today and pain.  Has some hydro with normal VSS  Normal WBC and Cr  UA obtained w WBC and epithelial cells.    Pain better controlled w one dose of Toradol 15 and one dose of zofran  Able to take fluids and domi po    Asked ER to give dose of Rocephin and second liter of NS    Will send with Zofran, Ketoralac prn to alternate with tylenol  F/u Cx: will treat with Cefdinir 300 po bid x 5 d    Explained ureteral edema to patient and that if she has hydro    We will plan on getting her back for ANTONIA and KUB in 1-2 wks.  Instructed to return if any further n/v/f/c, flank pain etc. no

## 2024-05-30 NOTE — ED PROVIDER NOTE - NSFOLLOWUPINSTRUCTIONS_ED_ALL_ED_FT
Rest, drink plenty of fluids  Advance activity as tolerated  Continue all previously prescribed medications as directed  Follow up with your PMD - bring copies of your results  Return to the ER for fevers, worsening symptoms, vomiting, severe pain, or other new or concerning symptoms  For pain, you may take Tylenol 975mg every six hours as needed  Take toradol as prescribed  Take zofran as prescribed as needed for nausea  Take antibiotics as prescribed

## 2024-05-30 NOTE — ED PROVIDER NOTE - CLINICAL SUMMARY MEDICAL DECISION MAKING FREE TEXT BOX
43yo female with pmh renal colic presenting with R flank pain, nausea.  About 2 weeks ago had lithotripsy and stent placed for R sided stone.  Yesterday had stent removed in office with Dr. Cuevas.  Was doing well until that time and since removal has been feeling some nausea and mild flank pain.  + Cold sweats.  No abd pain, vomiting, diarrhea.  Called office and directed to ED for eval.  Here patient well appearing in nad.  Reassuring exam.  Will get screening labs eval for leukocytosis, electrolytes, rory, uti.  Will sono eval for hydro.  Likely d/w uro and reassess.

## 2024-05-30 NOTE — ED PROVIDER NOTE - PATIENT PORTAL LINK FT
You can access the FollowMyHealth Patient Portal offered by [facility] by registering at the following website: [email or web address]. By joining [employer] [facility]’s FollowMyHealth portal, you will also be able to view your health information using other applications (apps) compatible with our system.

## 2024-05-30 NOTE — ED ADULT TRIAGE NOTE - CHIEF COMPLAINT QUOTE
Pt c/o nausea, weakness, and hematuria x 9 days. pt stated she had Lithotripsy procedure done on 5/21/24, since then have been having those symptoms  h/o: kidney stones

## 2024-05-30 NOTE — ED ADULT TRIAGE NOTE - GLASGOW COMA SCALE: SCORE, MLM
Health Maintenance Summary     Topic Due On Due Status Completed On    IMMUNIZATION - IPV  Completed Jun 20, 2017    IMMUNIZATION - MMR  Completed Jun 20, 2017    IMMUNIZATION - VARICELLA  Completed Jun 20, 2017    IMMUNIZATION - PNEUMOCOCCAL  Completed Jun 16, 2014    IMMUNIZATION - HEPATITIS B  Completed Dec 23, 2013    IMMUNIZATION - HIB  Completed Jun 16, 2014    IMMUNIZATION - ROTAVIRUS  Completed Dec 23, 2013    IMMUNIZATION - HEPATITIS A  Completed Dec 29, 2014    IMMUNIZATION - MENINGITIS Jun 11, 2024 Not Due     IMMUNIZATION - DTaP/Tdap/Td Jun 11, 2024 Not Due Jun 20, 2017    Immunization-Influenza Sep 1, 2017 Overdue Dec 23, 2013          Patient is up to date, no discussion needed . 15

## 2024-05-30 NOTE — ED PROVIDER NOTE - PHYSICAL EXAMINATION
General appearance: Nontoxic appearing, conversant, afebrile    Eyes: anicteric sclerae, EDSON, EOMI   HENT: Atraumatic; oropharynx clear, MMM and no ulcerations, no pharyngeal erythema or exudate   Neck: Trachea midline; Full range of motion, supple   Pulm: CTA bl, normal respiratory effort and no intercostal retractions, normal work of breathing   CV: RRR, No murmurs, rubs, or gallops   Abdomen: Soft, non-tender, non-distended; no guarding or rebound   Back: No midline ttp, step offs, deformities, no cvat    Extremities: No peripheral edema, no gross deformities, FROM x4   Skin: Dry, normal temperature, turgor and texture; no rash   Psych: Appropriate affect, cooperative

## 2024-05-30 NOTE — PHARMACOTHERAPY INTERVENTION NOTE - COMMENTS
Modified penicillin allergy to state that patient tolerated ceftriaxone during this admission 05/2024 with no documented reaction.

## 2024-06-02 PROBLEM — Z96.0 RETAINED URETERAL STENT: Status: ACTIVE | Noted: 2024-06-02

## 2024-06-02 PROBLEM — N20.0 RENAL CALCULUS, RIGHT: Status: ACTIVE | Noted: 2024-04-19

## 2024-06-02 PROBLEM — N20.1 RIGHT URETERAL STONE: Status: ACTIVE | Noted: 2024-04-19

## 2024-06-02 NOTE — ASSESSMENT
[FreeTextEntry1] : 7x24 right Stent removed intact today.  No complications. Cysto was normal.   Stone analysis not yet available. Will discuss with patient once ready.   Pt will have ANTONIA in 4 wks; if hydronephrosis resolved, will then do the 24 hr urine to assess and address her urinary milieu.    Basic tenets of stone avoidance reviewed in detail.  Follow up to discuss results.

## 2024-06-02 NOTE — ADDENDUM
[FreeTextEntry1] : This note was partly authored by Didier Lebron working as a scribe for MANSI Herrmann. I, MANSI Herrmann, have reviewed the content of this note and confirm it is true and accurate. I personally performed the history and physical examination and made all the decisions. 05/29/2024.   
No

## 2024-06-02 NOTE — HISTORY OF PRESENT ILLNESS
[FreeTextEntry1] : 05/29/2024: Ms. EVANS is a 44-year-old female presenting today for Cystoscopy w Stent removal.  Pt had Cystoscopy with Right Ureteroscopy, laser lithotripsy on 5/21/24 for right ureteral stone.   -4 mm Right ureteral Stone found on 4/18/24 CT A/P done at ER for right lower quadrant back pain.  with moderate rt hydroureter  -4/18/24 Cr 1.05 Egfr 68  -4/29 US Kidney and Bladder: No hydro and No renal calculi seen. Prevoid 104cc and PVR 12 cc. -4/29 Xray KUB: No definitive evidence of residual calculus  -5/2/24 CT renal stone hunt showed 8 mm Right ureteral stone which was seen in the mid ureter on prior examination appears to have migrated distally into the distal ureter. Resolved right hydronephrosis and hydroureter   In OR, the one renal calculus she had on CT imaging, migrated into the ureter as well and the two stones were noted in mid/distal ureter.

## 2024-06-03 DIAGNOSIS — Z96.0 PRESENCE OF UROGENITAL IMPLANTS: ICD-10-CM

## 2024-06-03 DIAGNOSIS — N20.1 CALCULUS OF URETER: ICD-10-CM

## 2024-06-03 DIAGNOSIS — N20.0 CALCULUS OF KIDNEY: ICD-10-CM

## 2024-06-07 LAB — SURGICAL PATHOLOGY STUDY: SIGNIFICANT CHANGE UP

## 2024-06-27 ENCOUNTER — APPOINTMENT (OUTPATIENT)
Dept: ULTRASOUND IMAGING | Facility: IMAGING CENTER | Age: 44
End: 2024-06-27
Payer: COMMERCIAL

## 2024-06-27 ENCOUNTER — OUTPATIENT (OUTPATIENT)
Dept: OUTPATIENT SERVICES | Facility: HOSPITAL | Age: 44
LOS: 1 days | End: 2024-06-27
Payer: COMMERCIAL

## 2024-06-27 DIAGNOSIS — Z98.890 OTHER SPECIFIED POSTPROCEDURAL STATES: Chronic | ICD-10-CM

## 2024-06-27 DIAGNOSIS — Z00.8 ENCOUNTER FOR OTHER GENERAL EXAMINATION: ICD-10-CM

## 2024-06-27 PROCEDURE — 76775 US EXAM ABDO BACK WALL LIM: CPT | Mod: 26

## 2024-06-27 PROCEDURE — 76775 US EXAM ABDO BACK WALL LIM: CPT

## 2024-07-09 ENCOUNTER — APPOINTMENT (OUTPATIENT)
Dept: UROLOGY | Facility: CLINIC | Age: 44
End: 2024-07-09
Payer: COMMERCIAL

## 2024-07-09 VITALS
OXYGEN SATURATION: 100 % | SYSTOLIC BLOOD PRESSURE: 126 MMHG | HEIGHT: 64 IN | BODY MASS INDEX: 22.56 KG/M2 | WEIGHT: 132.13 LBS | DIASTOLIC BLOOD PRESSURE: 85 MMHG | RESPIRATION RATE: 18 BRPM | HEART RATE: 87 BPM | TEMPERATURE: 98.1 F

## 2024-07-09 DIAGNOSIS — R93.89 ABNORMAL FINDINGS ON DIAGNOSTIC IMAGING OF OTHER SPECIFIED BODY STRUCTURES: ICD-10-CM

## 2024-07-09 DIAGNOSIS — N20.0 CALCULUS OF KIDNEY: ICD-10-CM

## 2024-07-09 PROCEDURE — 99214 OFFICE O/P EST MOD 30 MIN: CPT

## 2024-07-25 ENCOUNTER — APPOINTMENT (OUTPATIENT)
Dept: INFECTIOUS DISEASE | Facility: CLINIC | Age: 44
End: 2024-07-25
Payer: SELF-PAY

## 2024-07-25 DIAGNOSIS — Z71.84 ENC FOR HEALTH COUNSELING RELATED TO TRAVEL: ICD-10-CM

## 2024-07-25 PROCEDURE — 99401 PREV MED CNSL INDIV APPRX 15: CPT | Mod: 25

## 2024-07-25 PROCEDURE — 90471 IMMUNIZATION ADMIN: CPT

## 2024-07-25 PROCEDURE — 90713 POLIOVIRUS IPV SC/IM: CPT

## 2024-07-25 PROCEDURE — 90690 TYPHOID VACCINE ORAL: CPT

## 2024-07-25 PROCEDURE — 90474 IMMUNE ADMIN ORAL/NASAL ADDL: CPT

## 2024-07-25 PROCEDURE — 90632 HEPA VACCINE ADULT IM: CPT

## 2024-07-25 PROCEDURE — 90472 IMMUNIZATION ADMIN EACH ADD: CPT

## 2024-07-25 RX ORDER — AZITHROMYCIN 250 MG/1
250 TABLET, FILM COATED ORAL
Qty: 4 | Refills: 0 | Status: ACTIVE | COMMUNITY
Start: 2024-07-25 | End: 1900-01-01

## 2024-08-22 ENCOUNTER — APPOINTMENT (OUTPATIENT)
Dept: UROLOGY | Facility: CLINIC | Age: 44
End: 2024-08-22
Payer: COMMERCIAL

## 2024-08-22 VITALS
HEART RATE: 65 BPM | BODY MASS INDEX: 22.53 KG/M2 | WEIGHT: 132 LBS | SYSTOLIC BLOOD PRESSURE: 135 MMHG | DIASTOLIC BLOOD PRESSURE: 89 MMHG | RESPIRATION RATE: 17 BRPM | HEIGHT: 64 IN

## 2024-08-22 DIAGNOSIS — N20.0 CALCULUS OF KIDNEY: ICD-10-CM

## 2024-08-22 DIAGNOSIS — R82.994 HYPERCALCIURIA: ICD-10-CM

## 2024-08-22 PROCEDURE — 99213 OFFICE O/P EST LOW 20 MIN: CPT

## 2024-08-24 PROBLEM — R82.994 HYPERCALCIURIA: Status: ACTIVE | Noted: 2024-08-24

## 2024-08-24 NOTE — ADDENDUM
[FreeTextEntry1] : This note was partly authored by Garland Zapata working as a scribe for MANSI Herrmann. I, MANSI Herrmann, have reviewed the content of this note and confirm it is true and accurate. I personally performed the history and physical examination and made all the decisions. 08/22/2024.

## 2024-08-24 NOTE — HISTORY OF PRESENT ILLNESS
[FreeTextEntry1] : 08/22/2024: CARY EVANS is a 44 year-old F who presents with 24 hr urine  Visit on 7/9/24: - h/o stones with right ureteral stones x2 and hydro. S/p treatment and extraction in OR on 5/21/24. Stones were 60% CaP and 40% CaOxDi - CT prior had shown 2 ureteral stones and no others on 5/2/24 - 4/18/24 CT A/P w iv contrast had shown a 3x4 mm in RK and a 4x5 mm in ureter; on later CT the renal stone had moved into ureter - 6/27/24 had a F/u ANTONIA after surgery: This has pt concerned as it mentions a LK MP stone measuring 6 mm w no correlate on CT and resolved right hydronephrosis.  Today: Visiting [place] soon from the end of August to the beginning of September (for about 10 days)  volume improved but it is not enough need to drink up to 3 quarters of a liter  Last US Renal 7/27/24

## 2024-08-24 NOTE — ASSESSMENT
[FreeTextEntry1] : We reviewed and discussed her 24-hour urine.   volume: 1.84 calcium: 261 sodium: 190 oxalate: 38 citrate: 718 UA: 0.741 Animal protein markers: Normal Sulfur and phosphorus normal and the PCR  Based on her 24hr urine: - Increase urine volume above 2.5 liters; Drink at least 90 ounces of water  - Maintain a low sodium diet.  - I recommended her to focus on adding citrate to her diet. I recommended through squeezed coretta, oranges, or limes.  I explained that her oxalate on her 24hr is on the high end of normal. I explained that spinach kale, dark berries, nuts, and seeds may help her oxalate levels.

## 2024-12-09 ENCOUNTER — OUTPATIENT (OUTPATIENT)
Dept: OUTPATIENT SERVICES | Facility: HOSPITAL | Age: 44
LOS: 1 days | End: 2024-12-09
Payer: COMMERCIAL

## 2024-12-09 ENCOUNTER — APPOINTMENT (OUTPATIENT)
Dept: ULTRASOUND IMAGING | Facility: IMAGING CENTER | Age: 44
End: 2024-12-09
Payer: COMMERCIAL

## 2024-12-09 DIAGNOSIS — Z98.890 OTHER SPECIFIED POSTPROCEDURAL STATES: Chronic | ICD-10-CM

## 2024-12-09 DIAGNOSIS — N20.0 CALCULUS OF KIDNEY: ICD-10-CM

## 2024-12-09 PROCEDURE — 76775 US EXAM ABDO BACK WALL LIM: CPT

## 2024-12-09 PROCEDURE — 76775 US EXAM ABDO BACK WALL LIM: CPT | Mod: 26

## 2025-02-13 ENCOUNTER — TRANSCRIPTION ENCOUNTER (OUTPATIENT)
Age: 45
End: 2025-02-13

## 2025-02-13 ENCOUNTER — NON-APPOINTMENT (OUTPATIENT)
Age: 45
End: 2025-02-13

## 2025-02-19 ENCOUNTER — TRANSCRIPTION ENCOUNTER (OUTPATIENT)
Age: 45
End: 2025-02-19

## 2025-03-18 ENCOUNTER — TRANSCRIPTION ENCOUNTER (OUTPATIENT)
Age: 45
End: 2025-03-18

## 2025-03-20 ENCOUNTER — OUTPATIENT (OUTPATIENT)
Dept: OUTPATIENT SERVICES | Facility: HOSPITAL | Age: 45
LOS: 1 days | End: 2025-03-20

## 2025-03-20 DIAGNOSIS — Z00.8 ENCOUNTER FOR OTHER GENERAL EXAMINATION: ICD-10-CM

## 2025-03-20 DIAGNOSIS — Z98.890 OTHER SPECIFIED POSTPROCEDURAL STATES: Chronic | ICD-10-CM

## 2025-03-21 ENCOUNTER — APPOINTMENT (OUTPATIENT)
Dept: ULTRASOUND IMAGING | Facility: IMAGING CENTER | Age: 45
End: 2025-03-21
Payer: COMMERCIAL

## 2025-03-21 ENCOUNTER — APPOINTMENT (OUTPATIENT)
Dept: MAMMOGRAPHY | Facility: IMAGING CENTER | Age: 45
End: 2025-03-21
Payer: COMMERCIAL

## 2025-03-21 ENCOUNTER — OUTPATIENT (OUTPATIENT)
Dept: OUTPATIENT SERVICES | Facility: HOSPITAL | Age: 45
LOS: 1 days | End: 2025-03-21
Payer: COMMERCIAL

## 2025-03-21 DIAGNOSIS — Z00.8 ENCOUNTER FOR OTHER GENERAL EXAMINATION: ICD-10-CM

## 2025-03-21 DIAGNOSIS — Z98.890 OTHER SPECIFIED POSTPROCEDURAL STATES: Chronic | ICD-10-CM

## 2025-03-21 PROCEDURE — 76641 ULTRASOUND BREAST COMPLETE: CPT

## 2025-03-21 PROCEDURE — 76641 ULTRASOUND BREAST COMPLETE: CPT | Mod: 26,50

## 2025-03-21 PROCEDURE — 77067 SCR MAMMO BI INCL CAD: CPT

## 2025-03-21 PROCEDURE — 77063 BREAST TOMOSYNTHESIS BI: CPT | Mod: 26

## 2025-03-21 PROCEDURE — 77063 BREAST TOMOSYNTHESIS BI: CPT

## 2025-03-21 PROCEDURE — 77067 SCR MAMMO BI INCL CAD: CPT | Mod: 26

## 2025-04-02 ENCOUNTER — NON-APPOINTMENT (OUTPATIENT)
Age: 45
End: 2025-04-02

## 2025-04-09 ENCOUNTER — APPOINTMENT (OUTPATIENT)
Dept: DERMATOLOGY | Facility: CLINIC | Age: 45
End: 2025-04-09
Payer: COMMERCIAL

## 2025-04-09 DIAGNOSIS — Z12.83 ENCOUNTER FOR SCREENING FOR MALIGNANT NEOPLASM OF SKIN: ICD-10-CM

## 2025-04-09 DIAGNOSIS — L82.1 OTHER SEBORRHEIC KERATOSIS: ICD-10-CM

## 2025-04-09 DIAGNOSIS — D22.9 MELANOCYTIC NEVI, UNSPECIFIED: ICD-10-CM

## 2025-04-09 DIAGNOSIS — L81.4 OTHER MELANIN HYPERPIGMENTATION: ICD-10-CM

## 2025-04-09 DIAGNOSIS — L30.9 DERMATITIS, UNSPECIFIED: ICD-10-CM

## 2025-04-09 PROCEDURE — 99213 OFFICE O/P EST LOW 20 MIN: CPT

## 2025-04-15 RX ORDER — TACROLIMUS 1 MG/G
0.1 OINTMENT TOPICAL
Qty: 1 | Refills: 3 | Status: ACTIVE | COMMUNITY
Start: 2025-04-15 | End: 1900-01-01

## 2025-04-18 ENCOUNTER — TRANSCRIPTION ENCOUNTER (OUTPATIENT)
Age: 45
End: 2025-04-18

## 2025-04-23 ENCOUNTER — TRANSCRIPTION ENCOUNTER (OUTPATIENT)
Age: 45
End: 2025-04-23

## 2025-05-20 ENCOUNTER — TRANSCRIPTION ENCOUNTER (OUTPATIENT)
Age: 45
End: 2025-05-20

## 2025-05-22 ENCOUNTER — TRANSCRIPTION ENCOUNTER (OUTPATIENT)
Age: 45
End: 2025-05-22

## 2025-05-27 ENCOUNTER — OUTPATIENT (OUTPATIENT)
Dept: OUTPATIENT SERVICES | Facility: HOSPITAL | Age: 45
LOS: 1 days | End: 2025-05-27
Payer: COMMERCIAL

## 2025-05-27 ENCOUNTER — NON-APPOINTMENT (OUTPATIENT)
Age: 45
End: 2025-05-27

## 2025-05-27 ENCOUNTER — APPOINTMENT (OUTPATIENT)
Dept: ULTRASOUND IMAGING | Facility: CLINIC | Age: 45
End: 2025-05-27

## 2025-05-27 DIAGNOSIS — N20.0 CALCULUS OF KIDNEY: ICD-10-CM

## 2025-05-27 DIAGNOSIS — Z98.890 OTHER SPECIFIED POSTPROCEDURAL STATES: Chronic | ICD-10-CM

## 2025-05-27 PROCEDURE — 76775 US EXAM ABDO BACK WALL LIM: CPT | Mod: 26

## 2025-05-27 PROCEDURE — 76775 US EXAM ABDO BACK WALL LIM: CPT

## 2025-05-28 ENCOUNTER — NON-APPOINTMENT (OUTPATIENT)
Age: 45
End: 2025-05-28

## 2025-05-28 ENCOUNTER — APPOINTMENT (OUTPATIENT)
Dept: OTOLARYNGOLOGY | Facility: CLINIC | Age: 45
End: 2025-05-28
Payer: COMMERCIAL

## 2025-05-28 VITALS
HEIGHT: 64 IN | BODY MASS INDEX: 22.53 KG/M2 | DIASTOLIC BLOOD PRESSURE: 77 MMHG | HEART RATE: 95 BPM | SYSTOLIC BLOOD PRESSURE: 120 MMHG | WEIGHT: 132 LBS

## 2025-05-28 DIAGNOSIS — H61.22 IMPACTED CERUMEN, LEFT EAR: ICD-10-CM

## 2025-05-28 DIAGNOSIS — R05.8 OTHER SPECIFIED COUGH: ICD-10-CM

## 2025-05-28 PROBLEM — K21.9 LPRD (LARYNGOPHARYNGEAL REFLUX DISEASE): Status: ACTIVE | Noted: 2025-05-28

## 2025-05-28 PROCEDURE — 69210 REMOVE IMPACTED EAR WAX UNI: CPT | Mod: LT

## 2025-05-28 PROCEDURE — 31575 DIAGNOSTIC LARYNGOSCOPY: CPT

## 2025-05-28 PROCEDURE — 99203 OFFICE O/P NEW LOW 30 MIN: CPT | Mod: 25

## 2025-06-02 ENCOUNTER — APPOINTMENT (OUTPATIENT)
Dept: FAMILY MEDICINE | Facility: CLINIC | Age: 45
End: 2025-06-02
Payer: COMMERCIAL

## 2025-06-02 VITALS
RESPIRATION RATE: 16 BRPM | WEIGHT: 135 LBS | OXYGEN SATURATION: 98 % | BODY MASS INDEX: 23.05 KG/M2 | TEMPERATURE: 98.2 F | HEIGHT: 64 IN | HEART RATE: 96 BPM | DIASTOLIC BLOOD PRESSURE: 80 MMHG | SYSTOLIC BLOOD PRESSURE: 112 MMHG

## 2025-06-02 DIAGNOSIS — K21.9 GASTRO-ESOPHAGEAL REFLUX DISEASE W/OUT ESOPHAGITIS: ICD-10-CM

## 2025-06-02 DIAGNOSIS — Z00.00 ENCOUNTER FOR GENERAL ADULT MEDICAL EXAMINATION W/OUT ABNORMAL FINDINGS: ICD-10-CM

## 2025-06-02 DIAGNOSIS — R19.7 DIARRHEA, UNSPECIFIED: ICD-10-CM

## 2025-06-02 DIAGNOSIS — Z84.1 FAMILY HISTORY OF DISORDERS OF KIDNEY AND URETER: ICD-10-CM

## 2025-06-02 DIAGNOSIS — Z80.3 FAMILY HISTORY OF MALIGNANT NEOPLASM OF BREAST: ICD-10-CM

## 2025-06-02 DIAGNOSIS — Z80.42 FAMILY HISTORY OF MALIGNANT NEOPLASM OF PROSTATE: ICD-10-CM

## 2025-06-02 PROCEDURE — 36415 COLL VENOUS BLD VENIPUNCTURE: CPT

## 2025-06-02 PROCEDURE — 99386 PREV VISIT NEW AGE 40-64: CPT

## 2025-06-02 RX ORDER — CIPROFLOXACIN HYDROCHLORIDE 750 MG/1
750 TABLET, FILM COATED ORAL
Qty: 10 | Refills: 0 | Status: ACTIVE | COMMUNITY
Start: 2025-06-02 | End: 1900-01-01

## 2025-06-02 RX ORDER — MULTIVIT-MIN/IRON/FOLIC ACID/K 18-600-40
CAPSULE ORAL
Refills: 0 | Status: ACTIVE | COMMUNITY

## 2025-06-02 RX ORDER — OMEPRAZOLE 40 MG/1
40 CAPSULE, DELAYED RELEASE ORAL
Refills: 0 | Status: ACTIVE | COMMUNITY

## 2025-06-05 LAB
25(OH)D3 SERPL-MCNC: 104 NG/ML
ANION GAP SERPL CALC-SCNC: 16 MMOL/L
BUN SERPL-MCNC: 13 MG/DL
CALCIUM SERPL-MCNC: 9.5 MG/DL
CHLORIDE SERPL-SCNC: 104 MMOL/L
CHOLEST SERPL-MCNC: 152 MG/DL
CO2 SERPL-SCNC: 21 MMOL/L
CREAT SERPL-MCNC: 0.68 MG/DL
EGFRCR SERPLBLD CKD-EPI 2021: 110 ML/MIN/1.73M2
ESTIMATED AVERAGE GLUCOSE: 117 MG/DL
GLUCOSE SERPL-MCNC: 98 MG/DL
HBA1C MFR BLD HPLC: 5.7 %
HCT VFR BLD CALC: 39.8 %
HDLC SERPL-MCNC: 65 MG/DL
HGB BLD-MCNC: 13 G/DL
LDLC SERPL-MCNC: 68 MG/DL
MCHC RBC-ENTMCNC: 29.4 PG
MCHC RBC-ENTMCNC: 32.7 G/DL
MCV RBC AUTO: 90 FL
NONHDLC SERPL-MCNC: 87 MG/DL
PLATELET # BLD AUTO: 375 K/UL
POTASSIUM SERPL-SCNC: 4.2 MMOL/L
RBC # BLD: 4.42 M/UL
RBC # FLD: 14.2 %
SODIUM SERPL-SCNC: 141 MMOL/L
TRIGL SERPL-MCNC: 106 MG/DL
TSH SERPL-ACNC: 1.79 UIU/ML
WBC # FLD AUTO: 6.43 K/UL

## 2025-06-05 RX ORDER — MELOXICAM 15 MG/1
15 TABLET ORAL
Qty: 7 | Refills: 0 | Status: ACTIVE | COMMUNITY
Start: 2025-06-05 | End: 1900-01-01

## 2025-06-05 RX ORDER — CYCLOBENZAPRINE HYDROCHLORIDE 5 MG/1
5 TABLET, FILM COATED ORAL
Qty: 8 | Refills: 0 | Status: ACTIVE | COMMUNITY
Start: 2025-06-05 | End: 1900-01-01

## 2025-07-17 ENCOUNTER — TRANSCRIPTION ENCOUNTER (OUTPATIENT)
Age: 45
End: 2025-07-17

## 2025-07-22 ENCOUNTER — TRANSCRIPTION ENCOUNTER (OUTPATIENT)
Age: 45
End: 2025-07-22

## 2025-08-07 ENCOUNTER — APPOINTMENT (OUTPATIENT)
Dept: UROLOGY | Facility: CLINIC | Age: 45
End: 2025-08-07
Payer: COMMERCIAL

## 2025-08-07 VITALS
SYSTOLIC BLOOD PRESSURE: 114 MMHG | HEART RATE: 120 BPM | RESPIRATION RATE: 17 BRPM | WEIGHT: 135 LBS | BODY MASS INDEX: 23.05 KG/M2 | HEIGHT: 64 IN | TEMPERATURE: 97.9 F | DIASTOLIC BLOOD PRESSURE: 75 MMHG

## 2025-08-07 DIAGNOSIS — T45.2X1A POISONING BY VITAMINS, ACCIDENTAL (UNINTENTIONAL), INITIAL ENCOUNTER: ICD-10-CM

## 2025-08-07 DIAGNOSIS — R10.9 UNSPECIFIED ABDOMINAL PAIN: ICD-10-CM

## 2025-08-07 DIAGNOSIS — R82.991 HYPOCITRATURIA: ICD-10-CM

## 2025-08-07 DIAGNOSIS — N20.0 CALCULUS OF KIDNEY: ICD-10-CM

## 2025-08-07 PROCEDURE — 99214 OFFICE O/P EST MOD 30 MIN: CPT

## 2025-08-19 RX ORDER — POTASSIUM CITRATE 10 MEQ/1
10 MEQ TABLET, EXTENDED RELEASE ORAL 3 TIMES DAILY
Qty: 270 | Refills: 0 | Status: ACTIVE | COMMUNITY
Start: 2025-08-19 | End: 1900-01-01

## 2025-08-26 ENCOUNTER — TRANSCRIPTION ENCOUNTER (OUTPATIENT)
Age: 45
End: 2025-08-26

## 2025-08-28 ENCOUNTER — TRANSCRIPTION ENCOUNTER (OUTPATIENT)
Age: 45
End: 2025-08-28

## 2025-09-09 ENCOUNTER — LABORATORY RESULT (OUTPATIENT)
Age: 45
End: 2025-09-09

## 2025-09-12 ENCOUNTER — APPOINTMENT (OUTPATIENT)
Dept: RADIOLOGY | Facility: CLINIC | Age: 45
End: 2025-09-12
Payer: COMMERCIAL

## 2025-09-12 ENCOUNTER — APPOINTMENT (OUTPATIENT)
Dept: ULTRASOUND IMAGING | Facility: CLINIC | Age: 45
End: 2025-09-12
Payer: COMMERCIAL

## 2025-09-12 PROCEDURE — 74018 RADEX ABDOMEN 1 VIEW: CPT | Mod: 26

## 2025-09-12 PROCEDURE — 76775 US EXAM ABDO BACK WALL LIM: CPT | Mod: 26

## (undated) DEVICE — GLV 7 PROTEXIS (WHITE)

## (undated) DEVICE — GOWN TRIMAX LG

## (undated) DEVICE — TUBING SUCTION 20FT

## (undated) DEVICE — POSITIONER FOAM HEADREST (PINK)

## (undated) DEVICE — SOL IRR POUR NS 0.9% 500ML

## (undated) DEVICE — VENODYNE/SCD SLEEVE CALF LARGE

## (undated) DEVICE — GLV 7.5 PROTEXIS (WHITE)

## (undated) DEVICE — IRR BULB PATHFINDER + 10"

## (undated) DEVICE — TUBING RANGER FLUID IRRIGATION SET DISP

## (undated) DEVICE — FOLEY TRAY 16FR 5CC LTX UMETER CLOSED

## (undated) DEVICE — GLV 6.5 PROTEXIS (WHITE)

## (undated) DEVICE — SYR ASEPTO

## (undated) DEVICE — WARMING BLANKET UPPER ADULT

## (undated) DEVICE — FOLEY HOLDER STATLOCK 2 WAY ADULT

## (undated) DEVICE — GLV 8 PROTEXIS (WHITE)

## (undated) DEVICE — SOL IRR BAG H2O 3000ML

## (undated) DEVICE — ACMI SELF-SEALING SEAL UP TO 7FR

## (undated) DEVICE — NSA-C-ARM: Type: DURABLE MEDICAL EQUIPMENT

## (undated) DEVICE — POSITIONER FOAM EGG CRATE ULNAR 2PCS (PINK)

## (undated) DEVICE — DRAPE EQUIPMENT BANDED BAG 30 X 30" (SHOWER CAP)

## (undated) DEVICE — SOL IRR POUR H2O 1500ML

## (undated) DEVICE — ADAPTER CHECK FLO 9FR STERILE

## (undated) DEVICE — SOL IRR BAG NS 0.9% 3000ML

## (undated) DEVICE — PACK CYSTO